# Patient Record
Sex: MALE | Race: BLACK OR AFRICAN AMERICAN | NOT HISPANIC OR LATINO
[De-identification: names, ages, dates, MRNs, and addresses within clinical notes are randomized per-mention and may not be internally consistent; named-entity substitution may affect disease eponyms.]

---

## 2018-03-05 ENCOUNTER — APPOINTMENT (OUTPATIENT)
Dept: ORTHOPEDIC SURGERY | Facility: CLINIC | Age: 63
End: 2018-03-05
Payer: COMMERCIAL

## 2018-03-05 VITALS
HEIGHT: 67 IN | SYSTOLIC BLOOD PRESSURE: 176 MMHG | BODY MASS INDEX: 30.13 KG/M2 | HEART RATE: 96 BPM | WEIGHT: 192 LBS | DIASTOLIC BLOOD PRESSURE: 117 MMHG

## 2018-03-05 DIAGNOSIS — Z96.641 PRESENCE OF RIGHT ARTIFICIAL HIP JOINT: ICD-10-CM

## 2018-03-05 DIAGNOSIS — Z82.61 FAMILY HISTORY OF ARTHRITIS: ICD-10-CM

## 2018-03-05 DIAGNOSIS — Z78.9 OTHER SPECIFIED HEALTH STATUS: ICD-10-CM

## 2018-03-05 PROBLEM — Z00.00 ENCOUNTER FOR PREVENTIVE HEALTH EXAMINATION: Status: ACTIVE | Noted: 2018-03-05

## 2018-03-05 PROCEDURE — 99214 OFFICE O/P EST MOD 30 MIN: CPT

## 2018-03-05 RX ORDER — ESOMEPRAZOLE MAGNESIUM 5 MG/1
GRANULE, DELAYED RELEASE ORAL
Refills: 0 | Status: ACTIVE | COMMUNITY

## 2018-03-05 RX ORDER — AMLODIPINE BESYLATE 5 MG/1
TABLET ORAL
Refills: 0 | Status: ACTIVE | COMMUNITY

## 2018-03-05 RX ORDER — VALSARTAN 160 MG
CAPSULE ORAL
Refills: 0 | Status: ACTIVE | COMMUNITY

## 2018-06-18 ENCOUNTER — APPOINTMENT (OUTPATIENT)
Dept: ORTHOPEDIC SURGERY | Facility: CLINIC | Age: 63
End: 2018-06-18
Payer: COMMERCIAL

## 2018-06-18 VITALS
HEIGHT: 67 IN | DIASTOLIC BLOOD PRESSURE: 86 MMHG | BODY MASS INDEX: 29.82 KG/M2 | WEIGHT: 190 LBS | SYSTOLIC BLOOD PRESSURE: 158 MMHG | HEART RATE: 60 BPM

## 2018-06-18 DIAGNOSIS — M75.41 IMPINGEMENT SYNDROME OF RIGHT SHOULDER: ICD-10-CM

## 2018-06-18 PROCEDURE — 99213 OFFICE O/P EST LOW 20 MIN: CPT

## 2018-06-18 PROCEDURE — 73030 X-RAY EXAM OF SHOULDER: CPT | Mod: RT

## 2018-07-20 ENCOUNTER — APPOINTMENT (OUTPATIENT)
Dept: ORTHOPEDIC SURGERY | Facility: CLINIC | Age: 63
End: 2018-07-20
Payer: COMMERCIAL

## 2018-07-20 VITALS — WEIGHT: 190 LBS | BODY MASS INDEX: 29.82 KG/M2 | RESPIRATION RATE: 14 BRPM | HEIGHT: 67 IN

## 2018-07-20 PROCEDURE — 99214 OFFICE O/P EST MOD 30 MIN: CPT

## 2018-08-09 ENCOUNTER — OUTPATIENT (OUTPATIENT)
Dept: OUTPATIENT SERVICES | Facility: HOSPITAL | Age: 63
LOS: 1 days | End: 2018-08-09
Payer: COMMERCIAL

## 2018-08-09 VITALS
TEMPERATURE: 97 F | WEIGHT: 190.04 LBS | HEIGHT: 67 IN | RESPIRATION RATE: 18 BRPM | SYSTOLIC BLOOD PRESSURE: 144 MMHG | OXYGEN SATURATION: 98 % | HEART RATE: 70 BPM | DIASTOLIC BLOOD PRESSURE: 80 MMHG

## 2018-08-09 DIAGNOSIS — Z96.649 PRESENCE OF UNSPECIFIED ARTIFICIAL HIP JOINT: Chronic | ICD-10-CM

## 2018-08-09 DIAGNOSIS — Z90.81 ACQUIRED ABSENCE OF SPLEEN: Chronic | ICD-10-CM

## 2018-08-09 DIAGNOSIS — M19.011 PRIMARY OSTEOARTHRITIS, RIGHT SHOULDER: ICD-10-CM

## 2018-08-09 DIAGNOSIS — Z01.818 ENCOUNTER FOR OTHER PREPROCEDURAL EXAMINATION: ICD-10-CM

## 2018-08-09 DIAGNOSIS — Z98.89 OTHER SPECIFIED POSTPROCEDURAL STATES: Chronic | ICD-10-CM

## 2018-08-09 LAB
ALBUMIN SERPL ELPH-MCNC: 3.9 G/DL — SIGNIFICANT CHANGE UP (ref 3.3–5)
ALP SERPL-CCNC: 73 U/L — SIGNIFICANT CHANGE UP (ref 30–120)
ALT FLD-CCNC: 32 U/L DA — SIGNIFICANT CHANGE UP (ref 10–60)
ANION GAP SERPL CALC-SCNC: 8 MMOL/L — SIGNIFICANT CHANGE UP (ref 5–17)
APTT BLD: 32.3 SEC — SIGNIFICANT CHANGE UP (ref 27.5–37.4)
AST SERPL-CCNC: 28 U/L — SIGNIFICANT CHANGE UP (ref 10–40)
BILIRUB SERPL-MCNC: 0.3 MG/DL — SIGNIFICANT CHANGE UP (ref 0.2–1.2)
BLD GP AB SCN SERPL QL: SIGNIFICANT CHANGE UP
BUN SERPL-MCNC: 13 MG/DL — SIGNIFICANT CHANGE UP (ref 7–23)
CALCIUM SERPL-MCNC: 10 MG/DL — SIGNIFICANT CHANGE UP (ref 8.4–10.5)
CHLORIDE SERPL-SCNC: 99 MMOL/L — SIGNIFICANT CHANGE UP (ref 96–108)
CO2 SERPL-SCNC: 26 MMOL/L — SIGNIFICANT CHANGE UP (ref 22–31)
CREAT SERPL-MCNC: 1.08 MG/DL — SIGNIFICANT CHANGE UP (ref 0.5–1.3)
GLUCOSE SERPL-MCNC: 208 MG/DL — HIGH (ref 70–99)
HCT VFR BLD CALC: 41.7 % — SIGNIFICANT CHANGE UP (ref 39–50)
HGB BLD-MCNC: 14.3 G/DL — SIGNIFICANT CHANGE UP (ref 13–17)
INR BLD: 1.01 RATIO — SIGNIFICANT CHANGE UP (ref 0.88–1.16)
MCHC RBC-ENTMCNC: 28.8 PG — SIGNIFICANT CHANGE UP (ref 27–34)
MCHC RBC-ENTMCNC: 34.3 GM/DL — SIGNIFICANT CHANGE UP (ref 32–36)
MCV RBC AUTO: 83.9 FL — SIGNIFICANT CHANGE UP (ref 80–100)
NRBC # BLD: 0 /100 WBCS — SIGNIFICANT CHANGE UP (ref 0–0)
PLATELET # BLD AUTO: 282 K/UL — SIGNIFICANT CHANGE UP (ref 150–400)
POTASSIUM SERPL-MCNC: 3.6 MMOL/L — SIGNIFICANT CHANGE UP (ref 3.5–5.3)
POTASSIUM SERPL-SCNC: 3.6 MMOL/L — SIGNIFICANT CHANGE UP (ref 3.5–5.3)
PROT SERPL-MCNC: 8.5 G/DL — HIGH (ref 6–8.3)
PROTHROM AB SERPL-ACNC: 11 SEC — SIGNIFICANT CHANGE UP (ref 9.8–12.7)
RBC # BLD: 4.97 M/UL — SIGNIFICANT CHANGE UP (ref 4.2–5.8)
RBC # FLD: 15.1 % — HIGH (ref 10.3–14.5)
SODIUM SERPL-SCNC: 133 MMOL/L — LOW (ref 135–145)
WBC # BLD: 11.04 K/UL — HIGH (ref 3.8–10.5)
WBC # FLD AUTO: 11.04 K/UL — HIGH (ref 3.8–10.5)

## 2018-08-09 PROCEDURE — 93010 ELECTROCARDIOGRAM REPORT: CPT | Mod: NC

## 2018-08-09 NOTE — H&P PST ADULT - FAMILY HISTORY
Father  Still living? No  Family history of lung cancer, Age at diagnosis: Age Unknown  Family history of eczema, Age at diagnosis: Age Unknown     Mother  Still living? No  Family history of dementia, Age at diagnosis: Age Unknown  Family history of eczema, Age at diagnosis: Age Unknown  Family history of cervical cancer, Age at diagnosis: Age Unknown     Sibling  Still living? No  Family history of alcohol abuse, Age at diagnosis: Age Unknown  Family history of breast cancer, Age at diagnosis: Age Unknown  Family history of ovarian cancer, Age at diagnosis: Age Unknown
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
## 2018-08-09 NOTE — H&P PST ADULT - HISTORY OF PRESENT ILLNESS
62 y/o male with right shoulder pain. Denies any acute injury. Failed conservative therapy. Intermittent pain with activities. Here for PST in NAD

## 2018-08-10 LAB
HBA1C BLD-MCNC: 7.5 % — HIGH (ref 4–5.6)
MRSA PCR RESULT.: SIGNIFICANT CHANGE UP
S AUREUS DNA NOSE QL NAA+PROBE: DETECTED

## 2018-08-10 PROCEDURE — 86850 RBC ANTIBODY SCREEN: CPT

## 2018-08-10 PROCEDURE — G0463: CPT

## 2018-08-10 PROCEDURE — 85027 COMPLETE CBC AUTOMATED: CPT

## 2018-08-10 PROCEDURE — 85610 PROTHROMBIN TIME: CPT

## 2018-08-10 PROCEDURE — 83036 HEMOGLOBIN GLYCOSYLATED A1C: CPT

## 2018-08-10 PROCEDURE — 86900 BLOOD TYPING SEROLOGIC ABO: CPT

## 2018-08-10 PROCEDURE — 85730 THROMBOPLASTIN TIME PARTIAL: CPT

## 2018-08-10 PROCEDURE — 80053 COMPREHEN METABOLIC PANEL: CPT

## 2018-08-10 PROCEDURE — 87640 STAPH A DNA AMP PROBE: CPT

## 2018-08-10 PROCEDURE — 36415 COLL VENOUS BLD VENIPUNCTURE: CPT

## 2018-08-10 PROCEDURE — 93005 ELECTROCARDIOGRAM TRACING: CPT

## 2018-08-10 PROCEDURE — 86901 BLOOD TYPING SEROLOGIC RH(D): CPT

## 2018-08-10 RX ORDER — MUPIROCIN 20 MG/G
1 OINTMENT TOPICAL
Qty: 1 | Refills: 0 | OUTPATIENT
Start: 2018-08-10 | End: 2018-08-14

## 2018-08-10 NOTE — PROGRESS NOTE ADULT - SUBJECTIVE AND OBJECTIVE BOX
nose culture positive for MSSA. Mupirocin ointment 2% escribed and sent to patient's pharmacy. to be used twice a day for 5 consecutive days. called patient who verbalized an understanding of the treatment protocol. questions answered.

## 2018-08-29 ENCOUNTER — APPOINTMENT (OUTPATIENT)
Dept: ORTHOPEDIC SURGERY | Facility: HOSPITAL | Age: 63
End: 2018-08-29

## 2022-01-12 ENCOUNTER — APPOINTMENT (OUTPATIENT)
Dept: ORTHOPEDIC SURGERY | Facility: CLINIC | Age: 67
End: 2022-01-12
Payer: COMMERCIAL

## 2022-01-12 DIAGNOSIS — M16.11 UNILATERAL PRIMARY OSTEOARTHRITIS, RIGHT HIP: ICD-10-CM

## 2022-01-12 DIAGNOSIS — Z96.641 PRESENCE OF RIGHT ARTIFICIAL HIP JOINT: ICD-10-CM

## 2022-01-12 PROBLEM — M19.011 PRIMARY OSTEOARTHRITIS, RIGHT SHOULDER: Chronic | Status: ACTIVE | Noted: 2018-08-09

## 2022-01-12 PROCEDURE — 73502 X-RAY EXAM HIP UNI 2-3 VIEWS: CPT | Mod: RT

## 2022-01-12 PROCEDURE — 99202 OFFICE O/P NEW SF 15 MIN: CPT

## 2022-01-12 NOTE — DISCUSSION/SUMMARY
[de-identified] : Impression right hip gluteal tendinitis/strain, greater trochanteric bursitis, myofascial syndrome, rule out polyethylene wear with synovitis\par Plan MRI right hip, baseline CBC sed rate C-reactive protein

## 2022-01-12 NOTE — HISTORY OF PRESENT ILLNESS
[de-identified] : This is a 67-year-old male who 2016 underwent uneventful right anterior total hip replacement.  Doing well until 1 day ago when he woke up with moderately severe right lateral hip pain aggravated by weightbearing.  Denies fever or chills.  Denies low back pain or neurovascular symptoms lower extremity

## 2022-01-12 NOTE — PHYSICAL EXAM
[de-identified] : Constitutional:Well nourished , well developed and in no acute distress\par Psychiatric: Alert and oriented to time place and person.Appropriate affect \par Skin:Head, neck, arms and lower extremities:no lesions or discoloration\par HEENT: Normocephalic, EOM intact, Nasal septum midline,\par Respiratory: Unlabored respirations,no audible wheezing ,no tachypnea, no cyanosis\par Cardiovascular: no leg swelling  no ankle edema no JVD, pulse regular\par Vascular: no calf or thigh tenderness, \par Peripheral pulses; intact\par Lymphatics:No groin adenopathy,no lymphedema lower  or upper extremities\par Right hip antalgic gait leg length equal incision healed tender greater trochanter tender incision site passive range of motion rotation provokes mild to moderate pain flexion 90 internal 30 external 40 abduction 45 adduction 40 [de-identified] : X-ray AP pelvis right hip AP lateral oblique component alignment maintained no loosening subsidence

## 2022-09-29 ENCOUNTER — OUTPATIENT (OUTPATIENT)
Dept: OUTPATIENT SERVICES | Facility: HOSPITAL | Age: 67
LOS: 1 days | End: 2022-09-29
Payer: COMMERCIAL

## 2022-09-29 DIAGNOSIS — Z90.81 ACQUIRED ABSENCE OF SPLEEN: Chronic | ICD-10-CM

## 2022-09-29 DIAGNOSIS — Z96.649 PRESENCE OF UNSPECIFIED ARTIFICIAL HIP JOINT: Chronic | ICD-10-CM

## 2022-09-29 DIAGNOSIS — Z98.89 OTHER SPECIFIED POSTPROCEDURAL STATES: Chronic | ICD-10-CM

## 2022-09-29 DIAGNOSIS — R06.00 DYSPNEA, UNSPECIFIED: ICD-10-CM

## 2022-09-29 PROCEDURE — 93018 CV STRESS TEST I&R ONLY: CPT

## 2022-09-29 PROCEDURE — 93306 TTE W/DOPPLER COMPLETE: CPT | Mod: 26

## 2022-09-29 PROCEDURE — 93016 CV STRESS TEST SUPVJ ONLY: CPT

## 2022-09-29 PROCEDURE — 78452 HT MUSCLE IMAGE SPECT MULT: CPT | Mod: 26,MH

## 2022-10-03 PROCEDURE — 78452 HT MUSCLE IMAGE SPECT MULT: CPT | Mod: MH

## 2022-10-03 PROCEDURE — A9502: CPT

## 2022-10-03 PROCEDURE — 93017 CV STRESS TEST TRACING ONLY: CPT

## 2022-10-03 PROCEDURE — 93306 TTE W/DOPPLER COMPLETE: CPT

## 2022-10-26 ENCOUNTER — APPOINTMENT (OUTPATIENT)
Dept: UROLOGY | Facility: CLINIC | Age: 67
End: 2022-10-26

## 2022-11-10 ENCOUNTER — RESULT CHARGE (OUTPATIENT)
Age: 67
End: 2022-11-10

## 2022-11-10 ENCOUNTER — APPOINTMENT (OUTPATIENT)
Dept: ORTHOPEDIC SURGERY | Facility: CLINIC | Age: 67
End: 2022-11-10

## 2022-11-10 VITALS — WEIGHT: 191 LBS | HEIGHT: 67 IN | BODY MASS INDEX: 29.98 KG/M2

## 2022-11-10 DIAGNOSIS — M70.71 OTHER BURSITIS OF HIP, RIGHT HIP: ICD-10-CM

## 2022-11-10 DIAGNOSIS — M70.60 TROCHANTERIC BURSITIS, UNSPECIFIED HIP: ICD-10-CM

## 2022-11-10 DIAGNOSIS — M76.899 OTHER SPECIFIED ENTHESOPATHIES OF UNSPECIFIED LOWER LIMB, EXCLUDING FOOT: ICD-10-CM

## 2022-11-10 DIAGNOSIS — Z86.39 PERSONAL HISTORY OF OTHER ENDOCRINE, NUTRITIONAL AND METABOLIC DISEASE: ICD-10-CM

## 2022-11-10 DIAGNOSIS — S76.011A STRAIN OF MUSCLE, FASCIA AND TENDON OF RIGHT HIP, INITIAL ENCOUNTER: ICD-10-CM

## 2022-11-10 DIAGNOSIS — Z87.19 PERSONAL HISTORY OF OTHER DISEASES OF THE DIGESTIVE SYSTEM: ICD-10-CM

## 2022-11-10 DIAGNOSIS — Z86.79 PERSONAL HISTORY OF OTHER DISEASES OF THE CIRCULATORY SYSTEM: ICD-10-CM

## 2022-11-10 PROCEDURE — 72100 X-RAY EXAM L-S SPINE 2/3 VWS: CPT

## 2022-11-10 PROCEDURE — 99204 OFFICE O/P NEW MOD 45 MIN: CPT

## 2022-11-10 PROCEDURE — 73521 X-RAY EXAM HIPS BI 2 VIEWS: CPT

## 2022-11-10 RX ORDER — MELOXICAM 15 MG/1
15 TABLET ORAL
Qty: 30 | Refills: 2 | Status: ACTIVE | COMMUNITY
Start: 2022-11-10 | End: 1900-01-01

## 2022-11-10 NOTE — HISTORY OF PRESENT ILLNESS
[6] : 6 [Dull/Aching] : dull/aching [Radiating] : radiating [Rest] : rest [Heat] : heat [Standing] : standing [Walking] : walking [Stairs] : stairs [Full time] : Work status: full time [de-identified] : no recent injury. Patient is a 67 year old male here today for ongoing right hip pain for the past 6 months. Pain has worsened within the last 2 weeks. Has a history of total hip replacement in 2016. Patient was pain free for 3 years [] : Post Surgical Visit: no [FreeTextEntry1] : right hip [FreeTextEntry7] : down the right leg [de-identified] : 2016 [de-identified] : none

## 2022-11-10 NOTE — ASSESSMENT
[FreeTextEntry1] : 67 year M with right hip KIMBERLY and hip flexor tendonitis and GT bursitis\par - PT and NSAIDs PRN\par - Return in 6 weeks for follow up PRN

## 2022-11-10 NOTE — IMAGING
[de-identified] : Constitutional: well developed and well nourished, able to communicate\par Cardiovascular: Peripheral vascular exam is grossly normal\par Neurologic: Alert and oriented, no acute distress.\par Skin: normal skin with no ulcers, rashes, or lesions\par Pulmonary: No respiratory distress, breathing comfortably on room air\par Lymphatics: No obvious lymphadenopathy or lymphedema in areas examined\par  \par LOWER EXTREMITY/ RIGHT HIP EXAM\par Standing pelvic alignment: Symmetric with no Trendelenburg\par Atrophy: none\par Ecchymosis/swelling: none\par \par Range of Motion\par Hip: Flexion/extension/ER/IR     / EX 20/ ER 45/ IR 20 / AB 60 /AD 20\par Impingement with flexion adduction and internal rotation: negative\par Contracture: none\par Snapping hip: negative\par Greater trochanter: POS tenderness\par \par Neurovascular\par Distal extremities: warm to touch\par Sensation to light touch: intact\par Muscle strength: 5/5\par pain with resisted hip flexion\par \par Back\par Alignment: normal\par Spinous process: no tenderness\par Paraspinal tenderness: No tenderness\par Range of motion: full and pain free\par Scoliosis: none\par Straight leg sign: negative\par \par IMAGING:\par 11/10/2022 Xrays of the Right hip 3v were taken demonstrating KIMBERLY in place w/o signs of loosening and lumbar with L5/S1 DDD

## 2023-04-29 ENCOUNTER — EMERGENCY (EMERGENCY)
Facility: HOSPITAL | Age: 68
LOS: 1 days | Discharge: HOME | End: 2023-04-29
Admitting: STUDENT IN AN ORGANIZED HEALTH CARE EDUCATION/TRAINING PROGRAM
Payer: COMMERCIAL

## 2023-04-29 VITALS
SYSTOLIC BLOOD PRESSURE: 156 MMHG | HEART RATE: 78 BPM | RESPIRATION RATE: 18 BRPM | TEMPERATURE: 99 F | OXYGEN SATURATION: 100 % | DIASTOLIC BLOOD PRESSURE: 81 MMHG

## 2023-04-29 DIAGNOSIS — Z90.81 ACQUIRED ABSENCE OF SPLEEN: Chronic | ICD-10-CM

## 2023-04-29 DIAGNOSIS — Z98.89 OTHER SPECIFIED POSTPROCEDURAL STATES: Chronic | ICD-10-CM

## 2023-04-29 DIAGNOSIS — Z96.649 PRESENCE OF UNSPECIFIED ARTIFICIAL HIP JOINT: Chronic | ICD-10-CM

## 2023-04-29 PROCEDURE — 99284 EMERGENCY DEPT VISIT MOD MDM: CPT

## 2023-04-29 PROCEDURE — 73030 X-RAY EXAM OF SHOULDER: CPT | Mod: 26,RT

## 2023-04-29 RX ORDER — ACETAMINOPHEN 500 MG
975 TABLET ORAL ONCE
Refills: 0 | Status: COMPLETED | OUTPATIENT
Start: 2023-04-29 | End: 2023-04-29

## 2023-04-29 RX ORDER — KETOROLAC TROMETHAMINE 30 MG/ML
15 SYRINGE (ML) INJECTION ONCE
Refills: 0 | Status: DISCONTINUED | OUTPATIENT
Start: 2023-04-29 | End: 2023-04-29

## 2023-04-29 RX ORDER — LIDOCAINE 4 G/100G
1 CREAM TOPICAL ONCE
Refills: 0 | Status: COMPLETED | OUTPATIENT
Start: 2023-04-29 | End: 2023-04-29

## 2023-04-29 RX ORDER — DIAZEPAM 5 MG
2 TABLET ORAL ONCE
Refills: 0 | Status: DISCONTINUED | OUTPATIENT
Start: 2023-04-29 | End: 2023-04-29

## 2023-04-29 RX ORDER — IBUPROFEN 200 MG
1 TABLET ORAL
Qty: 15 | Refills: 0
Start: 2023-04-29 | End: 2023-05-03

## 2023-04-29 RX ADMIN — LIDOCAINE 1 PATCH: 4 CREAM TOPICAL at 18:54

## 2023-04-29 RX ADMIN — Medication 975 MILLIGRAM(S): at 21:30

## 2023-04-29 RX ADMIN — Medication 2 MILLIGRAM(S): at 18:59

## 2023-04-29 RX ADMIN — Medication 15 MILLIGRAM(S): at 18:52

## 2023-04-29 NOTE — ED PROVIDER NOTE - OBJECTIVE STATEMENT
68yoM PMH HTN, HLD, PreDM, GERD, prior rotator cuff injury years ago, p/w atraumatic shoulder pain starting last night which woke him up from sleep. sharp, worse with movement, gradually worsening this morning prompting ER eval. Pt denies any numbness, tingling. R hand dominant. was told he needed rotator cuff repair surgically years ago however did not get Surgery at that time. Pt denies any inciting injury, heavy lifting. Took 2 ibuprofen this AM around 7:30 with minimal relief, and took 2 tylenol around 4:30PM. However patient does play instruments including piano. Pt denies any fevers, neck pain, chest pain, sob, dizziness, cough, sore throat, weakness.

## 2023-04-29 NOTE — ED PROVIDER NOTE - CLINICAL SUMMARY MEDICAL DECISION MAKING FREE TEXT BOX
68yoM PMH HTN, HLD, PreDM, GERD, prior rotator cuff injury years ago, p/w atraumatic shoulder pain starting last night which woke him up from sleep  REduced ROM, rotator cuff ttp, + ahumada, sensation/pulses intact  + ttp of trapezius ttp, maybe concomitant muscle spasm    will give toradol, valium, xray, and outpatient ortho f/u

## 2023-04-29 NOTE — ED ADULT TRIAGE NOTE - CHIEF COMPLAINT QUOTE
Pt states had an old injury to right rotator cuff . Pt states last night  into this am pain increased. Pt states not relieved with Motrin.

## 2023-04-29 NOTE — ED ADULT NURSE NOTE - OBJECTIVE STATEMENT
A&Ox4. ambulatory. c/o right shoulder pain. PT states he was to have surgery for his rotator cuff but because of COVID pandemic it was not done. NAD. pt denies SOB, chest pain, dizziness, weakness, urinary symptoms, HA, n/v/d, fevers, chills. respirations are even and un labored. skin intact. safety precautions maintained.
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
## 2023-04-29 NOTE — ED PROVIDER NOTE - PATIENT PORTAL LINK FT
You can access the FollowMyHealth Patient Portal offered by [facility] by registering at the following website: [email or web address]. By joining [employer]’s FollowMyHealth portal, you will also be able to view your health information using other applications (apps) compatible with our system.

## 2023-04-29 NOTE — ED PROVIDER NOTE - NSICDXFAMILYHX_GEN_ALL_CORE_FT
FAMILY HISTORY:  Father  Still living? No  Family history of eczema, Age at diagnosis: Age Unknown  Family history of lung cancer, Age at diagnosis: Age Unknown    Mother  Still living? No  Family history of cervical cancer, Age at diagnosis: Age Unknown  Family history of dementia, Age at diagnosis: Age Unknown  Family history of eczema, Age at diagnosis: Age Unknown    Sibling  Still living? No  Family history of alcohol abuse, Age at diagnosis: Age Unknown  Family history of breast cancer, Age at diagnosis: Age Unknown  Family history of ovarian cancer, Age at diagnosis: Age Unknown

## 2023-04-29 NOTE — ED PROVIDER NOTE - BIRTH SEX
Male Complex Repair And Ftsg Text: The defect edges were debeveled with a #15 scalpel blade.  The primary defect was closed partially with a complex linear closure.  Given the location of the defect, shape of the defect and the proximity to free margins a full thickness skin graft was deemed most appropriate to repair the remaining defect.  The graft was trimmed to fit the size of the remaining defect.  The graft was then placed in the primary defect, oriented appropriately, and sutured into place.

## 2023-04-29 NOTE — ED PROVIDER NOTE - PROGRESS NOTE DETAILS
pain improved, xrays revealing arthritis will place in sling due to pain w/ ROM, however advised to not exceed more than 48 hours and to f/u with ortopedic outpatient, discharge lounge button clicked
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
## 2023-04-29 NOTE — ED PROVIDER NOTE - NSFOLLOWUPCLINICS_GEN_ALL_ED_FT
[facility] Orthopedic Surgery  Orthopedic Surgery  [address], 3rd & [address]th [place] [address]  Phone: [phone]  Fax:     [facility]  Orthopedic Surgery  [address] [address]  Phone: [phone]  Fax:

## 2023-04-29 NOTE — ED PROVIDER NOTE - PHYSICAL EXAMINATION
CONSTITUTIONAL: Well-appearing; well-nourished; in no apparent distress;  HEAD: Normocephalic, atraumatic;  EYES: conjunctiva and sclera WNL;  ENT: normal nose;  NECK/LYMPH: Supple  CARD: Normal S1, S2; no murmurs, rubs, or gallops noted  RESP: Normal chest excursion with respiration; breath sounds clear and equal bilaterally; no wheezes, rhonchi, or rales noted  EXT/MS: no visible deformity. + ttp of R scapular region. + ttp of R glenohumeral joint. + posterior deltoid ttp. + pain w/ passive and active ROM. + ahumada test. unable to fully abduct and flex arm actively 2/2 pain. sensation intact.  strength 5/5 b/l. no elbow ttp. moves all extremities; distal pulses are normal, no pedal edema  SKIN: Normal for age and race; warm; dry; good turgor; no apparent lesions or exudate noted  NEURO: Awake, alert, oriented x 3, no gross deficits, CN II-XII grossly intact, no motor or sensory deficit noted  PSYCH: Normal mood; appropriate affect

## 2023-04-29 NOTE — ED PROVIDER NOTE - NSFOLLOWUPINSTRUCTIONS_ED_ALL_ED_FT
Shoulder Pain    WHAT YOU NEED TO KNOW:    Shoulder pain is a common problem that can affect your daily activities. Pain can be caused by a problem within your shoulder, such as soreness of a tendon or bursa. A tendon is a cord of tough tissue that connects your muscles to your bones. The bursa is a fluid-filled sac that acts as a cushion between a bone and a tendon. Shoulder pain may also be caused by pain that spreads to your shoulder from another part of your body.  Shoulder Anatomy    DISCHARGE INSTRUCTIONS:    Return to the emergency department if:    You have severe pain.    You cannot move your arm or shoulder.    You have numbness or tingling in your shoulder or arm.  Contact your healthcare provider if:    Your pain gets worse or does not go away with treatment.    You have trouble moving your arm or shoulder.    You have questions or concerns about your condition or care.  Medicines: You may need any of the following:    Acetaminophen decreases pain and fever. It is available without a doctor's order. Ask how much to take and how often to take it. Follow directions. Read the labels of all other medicines you are using to see if they also contain acetaminophen, or ask your doctor or pharmacist. Acetaminophen can cause liver damage if not taken correctly. Do not use more than 4 grams (4,000 milligrams) total of acetaminophen in one day.    NSAIDs, such as ibuprofen, help decrease swelling, pain, and fever. This medicine is available with or without a doctor's order. NSAIDs can cause stomach bleeding or kidney problems in certain people. If you take blood thinner medicine, always ask your healthcare provider if NSAIDs are safe for you. Always read the medicine label and follow directions.    Take your medicine as directed. Contact your healthcare provider if you think your medicine is not helping or if you have side effects. Tell him of her if you are allergic to any medicine. Keep a list of the medicines, vitamins, and herbs you take. Include the amounts, and when and why you take them. Bring the list or the pill bottles to follow-up visits. Carry your medicine list with you in case of an emergency.  Manage your symptoms:    Apply ice on your shoulder for 20 to 30 minutes every 2 hours or as directed. Use an ice pack, or put crushed ice in a plastic bag. Cover it with a towel before you apply it to your shoulder. Ice helps prevent tissue damage and decreases swelling and pain.    Apply heat if ice does not help your symptoms. Apply heat on your shoulder for 20 to 30 minutes every 2 hours for as many days as directed. Heat helps decrease pain and muscle spasms.    Limit activities as directed. Try to avoid repeated overhead movements.    Go to physical or occupational therapy as directed. A physical therapist teaches you exercises to help improve movement and strength, and to decrease pain. An occupational therapist teaches you skills to help with your daily activities.  Prevent shoulder pain:    Maintain a good range of motion in your shoulder. Ask your healthcare provider which exercises you should do on a regular basis after you have healed.    Stretch and strengthen your shoulder. Use proper technique during exercises and sports.  Follow up with your healthcare provider or orthopedist as directed: Write down your questions so you remember to ask them during your visits.

## 2023-05-11 ENCOUNTER — APPOINTMENT (OUTPATIENT)
Dept: ORTHOPEDIC SURGERY | Facility: CLINIC | Age: 68
End: 2023-05-11
Payer: COMMERCIAL

## 2023-05-11 PROCEDURE — 99213 OFFICE O/P EST LOW 20 MIN: CPT

## 2023-05-11 NOTE — PHYSICAL EXAM
[de-identified] : Patient is WDWN, alert, and in no acute distress. Breathing is unlabored. He is grossly oriented to person, place, and time.\par \par Right Shoulder:\par ¦ Inspection/Palpation :there is localized tenderness present to the greater tuberosity, there is no AC joint tenderness, no swelling or deformities\par ¦ Range of Motion : ACTIVE FORWARD ELEVATION: Measured at 130 degrees, ACTIVE EXTERNAL ROTATION: Measured at 45 degrees, ACTIVE INTERNAL ROTATION: Arc of motion to L1\par ¦ Strength : internal rotation 5/5, external rotation 4+/5, supraspinatus 4/5\par ¦ Stability : no joint instability on provocative testing\par ¦ Tests/Signs : +Neer, + Trivdei \par o Upper Arm : no tenderness, swelling or deformities\par o Muscle Bulk : no atrophy o Sensation : sensation intact to light touch \par o Skin : no skin rash or discoloration\par o Vascular Exam : no edema or cyanosis, radial and ulnar pulses normal  [de-identified] : EXAM: XR SHOULDER COMP MIN 2V RT\par PROCEDURE DATE: 04/29/2023\par IMPRESSION:\par No acute fracture or dislocation.\par Severe osteoarthritis of the glenohumeral joint. Unremarkable AC joint.\par The visualized right lung is clear.\par \par JEYSON MURDOCK DO; Resident Radiologist\par This document has been electronically signed.\par HEATHER SHAIKH MD; Attending Radiologist\par This document has been electronically signed. Apr 30 2023 8:59AM\par \par ------------------------------------------------------------------------------------------------------------------------------------------------------------------------------------\par \par [facility] the right [facility] and shows marked glenohumeral articulation degenerative arthroplasty, 3.9 cm infraspinatus intramuscular cyst with partial tear of the rotator cuff, insertional partial tear of the rotator cuff associated with the distal supraspinatus tendon with adjacent bursal effusion, partial tear rotator cuff also identified at the undersurface of the proximal supraspinatus tendon, supraspinatus and subscapularis tendinopathy, biceps tenosynovitis.
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
## 2023-05-11 NOTE — ADDENDUM
[FreeTextEntry1] : I, Lexii Carrillo wrote this note acting as a scribe for Dr. Robert Shah on May 11, 2023.

## 2023-05-11 NOTE — END OF VISIT
[FreeTextEntry3] : All medical record entries made by the Scribe were at my,  Dr. Robert Shah MD., direction and personally dictated by me on 05/11/2023. I have personally reviewed the chart and agree that the record accurately reflects my personal performance of the history, physical exam, assessment and plan.

## 2023-05-11 NOTE — DISCUSSION/SUMMARY
[de-identified] : The underlying pathophysiology was reviewed with the patient. XR films were reviewed with the patient. Discussed at length the nature of the patient’s condition. The right shoulder symptoms are secondary to severe arthritis of the glenohumeral joint.\par \par At this time, given the severity of arthritis seen at the glenohumeral joint and his persistent symptoms, I told him he may want to consider a shoulder replacement. I explained to him that this is not a procedure I perform and would refer him to Dr. Lorenzana. He stated he is interested in undergoing a total shoulder replacement but is not able to do so until about August. I told him that is reasonable as this is by no means an emergency. He will follow up with Dr. Lorenzana for surgical consult. \par \par All questions answered, understanding verbalized. Patient in agreement with plan of care. Follow up as needed.

## 2023-05-23 ENCOUNTER — APPOINTMENT (OUTPATIENT)
Dept: ORTHOPEDIC SURGERY | Facility: CLINIC | Age: 68
End: 2023-05-23

## 2023-06-20 ENCOUNTER — APPOINTMENT (OUTPATIENT)
Dept: ORTHOPEDIC SURGERY | Facility: CLINIC | Age: 68
End: 2023-06-20

## 2023-06-30 ENCOUNTER — APPOINTMENT (OUTPATIENT)
Dept: ORTHOPEDIC SURGERY | Facility: CLINIC | Age: 68
End: 2023-06-30
Payer: COMMERCIAL

## 2023-06-30 VITALS — HEIGHT: 67 IN | WEIGHT: 191 LBS | BODY MASS INDEX: 29.98 KG/M2

## 2023-06-30 DIAGNOSIS — Z01.818 ENCOUNTER FOR OTHER PREPROCEDURAL EXAMINATION: ICD-10-CM

## 2023-06-30 DIAGNOSIS — M19.012 PRIMARY OSTEOARTHRITIS, LEFT SHOULDER: ICD-10-CM

## 2023-06-30 DIAGNOSIS — M19.011 PRIMARY OSTEOARTHRITIS, RIGHT SHOULDER: ICD-10-CM

## 2023-06-30 PROCEDURE — 73030 X-RAY EXAM OF SHOULDER: CPT | Mod: 50

## 2023-06-30 PROCEDURE — 99214 OFFICE O/P EST MOD 30 MIN: CPT

## 2023-08-23 ENCOUNTER — APPOINTMENT (OUTPATIENT)
Dept: ORTHOPEDIC SURGERY | Facility: HOSPITAL | Age: 68
End: 2023-08-23

## 2024-10-10 ENCOUNTER — EMERGENCY (EMERGENCY)
Facility: HOSPITAL | Age: 69
LOS: 0 days | Discharge: HOME | End: 2024-10-10
Attending: EMERGENCY MEDICINE
Payer: COMMERCIAL

## 2024-10-10 VITALS
OXYGEN SATURATION: 99 % | HEIGHT: 67 IN | TEMPERATURE: 98 F | DIASTOLIC BLOOD PRESSURE: 89 MMHG | SYSTOLIC BLOOD PRESSURE: 155 MMHG | WEIGHT: 190.92 LBS | HEART RATE: 75 BPM | RESPIRATION RATE: 18 BRPM

## 2024-10-10 VITALS
RESPIRATION RATE: 19 BRPM | DIASTOLIC BLOOD PRESSURE: 84 MMHG | TEMPERATURE: 98 F | OXYGEN SATURATION: 96 % | SYSTOLIC BLOOD PRESSURE: 155 MMHG | HEART RATE: 88 BPM

## 2024-10-10 DIAGNOSIS — Z98.89 OTHER SPECIFIED POSTPROCEDURAL STATES: Chronic | ICD-10-CM
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
## 2024-10-10 DIAGNOSIS — M25.562 PAIN IN LEFT KNEE: ICD-10-CM

## 2024-10-10 DIAGNOSIS — M25.512 PAIN IN LEFT SHOULDER: ICD-10-CM

## 2024-10-10 DIAGNOSIS — F17.200 NICOTINE DEPENDENCE, UNSPECIFIED, UNCOMPLICATED: ICD-10-CM
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
## 2024-10-10 DIAGNOSIS — Z96.649 PRESENCE OF UNSPECIFIED ARTIFICIAL HIP JOINT: Chronic | ICD-10-CM
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
## 2024-10-10 DIAGNOSIS — Z90.81 ACQUIRED ABSENCE OF SPLEEN: Chronic | ICD-10-CM

## 2024-10-10 DIAGNOSIS — I10 ESSENTIAL (PRIMARY) HYPERTENSION: ICD-10-CM

## 2024-10-10 PROCEDURE — 73562 X-RAY EXAM OF KNEE 3: CPT | Mod: 26,LT

## 2024-10-10 PROCEDURE — 73030 X-RAY EXAM OF SHOULDER: CPT | Mod: 26,LT

## 2024-10-10 PROCEDURE — 99284 EMERGENCY DEPT VISIT MOD MDM: CPT

## 2024-10-10 RX ADMIN — Medication 600 MILLIGRAM(S): at 10:16

## 2024-10-10 NOTE — ED PROVIDER NOTE - PATIENT PORTAL LINK FT
You can access the FollowMyHealth Patient Portal offered by [facility] by registering at the following website: [email or web address]. By joining [employer]’s FollowMyHealth portal, you will also be able to view your health information using other applications (apps) compatible with our system.
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
## 2024-10-10 NOTE — ED PROVIDER NOTE - PHYSICAL EXAMINATION
On Physical Exam:  General: well appearing, in NAD, speaking clearly in full sentences and without difficulty; cooperative with exam  HEENT: PERRL, MMM  Neck: no neck tenderness, no nuchal rigidity; FROM of neck  Cardiac: normal s1, s2; RRR; no MGR  Lungs: CTABL  Abdomen: soft nontender/nondistended  : no bladder tenderness or distension  Skin: intact, no rash  Extremities: no peripheral edema, no gross deformities  -LUE: L posterior shoulder mild tenderness and unable to fully extend arm (cannot raise arm above head) but no cliavular tenderness or anterior tenderness; L elbow has FROM is nontender; L wrist has FROM is nontender.   -RUE: no gross deformities or pain  -RLE: no gross deformities or pain  -LLE: mild tenderness over superior portion of patella but no deformity, has FROM and no laxity, no pain with valgus/varus tension or axial loading  Neuro: GCS 15, stable gait observed
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
## 2024-10-10 NOTE — ED PROVIDER NOTE - WR INTERPRETATION 1
Left shoulder noted to have circular radiodensity lateral to the humeral head approximately 6 mm.  No evidence of fracture dislocation joint.
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
## 2024-10-10 NOTE — ED PROVIDER NOTE - WR INTERPRETATION 2
Opacification superior to patella question avulsion but more likely osteophyte or calcification of quad tendon no other evidence of fractures.  No dislocation.
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
## 2025-03-05 ENCOUNTER — INPATIENT (INPATIENT)
Facility: HOSPITAL | Age: 70
LOS: 0 days | Discharge: HOME | End: 2025-03-06
Attending: INTERNAL MEDICINE | Admitting: INTERNAL MEDICINE
Payer: COMMERCIAL

## 2025-03-05 VITALS
HEART RATE: 65 BPM | OXYGEN SATURATION: 100 % | HEIGHT: 67 IN | RESPIRATION RATE: 15 BRPM | DIASTOLIC BLOOD PRESSURE: 80 MMHG | WEIGHT: 199.96 LBS | TEMPERATURE: 98 F | SYSTOLIC BLOOD PRESSURE: 155 MMHG

## 2025-03-05 DIAGNOSIS — Z90.81 ACQUIRED ABSENCE OF SPLEEN: Chronic | ICD-10-CM

## 2025-03-05 DIAGNOSIS — Z98.89 OTHER SPECIFIED POSTPROCEDURAL STATES: Chronic | ICD-10-CM

## 2025-03-05 DIAGNOSIS — Z98.890 OTHER SPECIFIED POSTPROCEDURAL STATES: Chronic | ICD-10-CM

## 2025-03-05 DIAGNOSIS — R06.09 OTHER FORMS OF DYSPNEA: ICD-10-CM

## 2025-03-05 DIAGNOSIS — Z96.649 PRESENCE OF UNSPECIFIED ARTIFICIAL HIP JOINT: Chronic | ICD-10-CM

## 2025-03-05 LAB
ALBUMIN SERPL ELPH-MCNC: 4.4 G/DL — SIGNIFICANT CHANGE UP (ref 3.3–5)
ALP SERPL-CCNC: 68 U/L — SIGNIFICANT CHANGE UP (ref 40–120)
ALT FLD-CCNC: 23 U/L — SIGNIFICANT CHANGE UP (ref 4–41)
ANION GAP SERPL CALC-SCNC: 14 MMOL/L — SIGNIFICANT CHANGE UP (ref 7–14)
AST SERPL-CCNC: 28 U/L — SIGNIFICANT CHANGE UP (ref 4–40)
BILIRUB SERPL-MCNC: 0.3 MG/DL — SIGNIFICANT CHANGE UP (ref 0.2–1.2)
BUN SERPL-MCNC: 14 MG/DL — SIGNIFICANT CHANGE UP (ref 7–23)
CALCIUM SERPL-MCNC: 10.4 MG/DL — SIGNIFICANT CHANGE UP (ref 8.4–10.5)
CHLORIDE SERPL-SCNC: 101 MMOL/L — SIGNIFICANT CHANGE UP (ref 98–107)
CO2 SERPL-SCNC: 25 MMOL/L — SIGNIFICANT CHANGE UP (ref 22–31)
CREAT SERPL-MCNC: 0.76 MG/DL — SIGNIFICANT CHANGE UP (ref 0.5–1.3)
EGFR: 98 ML/MIN/1.73M2 — SIGNIFICANT CHANGE UP
EGFR: 98 ML/MIN/1.73M2 — SIGNIFICANT CHANGE UP
GLUCOSE SERPL-MCNC: 139 MG/DL — HIGH (ref 70–99)
HCT VFR BLD CALC: 40.6 % — SIGNIFICANT CHANGE UP (ref 39–50)
HGB BLD-MCNC: 13.8 G/DL — SIGNIFICANT CHANGE UP (ref 13–17)
MAGNESIUM SERPL-MCNC: 1.7 MG/DL — SIGNIFICANT CHANGE UP (ref 1.6–2.6)
MCHC RBC-ENTMCNC: 28.4 PG — SIGNIFICANT CHANGE UP (ref 27–34)
MCHC RBC-ENTMCNC: 34 G/DL — SIGNIFICANT CHANGE UP (ref 32–36)
MCV RBC AUTO: 83.5 FL — SIGNIFICANT CHANGE UP (ref 80–100)
NRBC # BLD AUTO: 0 K/UL — SIGNIFICANT CHANGE UP (ref 0–0)
NRBC # FLD: 0 K/UL — SIGNIFICANT CHANGE UP (ref 0–0)
NRBC BLD AUTO-RTO: 0 /100 WBCS — SIGNIFICANT CHANGE UP (ref 0–0)
PHOSPHATE SERPL-MCNC: 2.8 MG/DL — SIGNIFICANT CHANGE UP (ref 2.5–4.5)
PLATELET # BLD AUTO: 313 K/UL — SIGNIFICANT CHANGE UP (ref 150–400)
POTASSIUM SERPL-MCNC: 3.6 MMOL/L — SIGNIFICANT CHANGE UP (ref 3.5–5.3)
POTASSIUM SERPL-SCNC: 3.6 MMOL/L — SIGNIFICANT CHANGE UP (ref 3.5–5.3)
PROT SERPL-MCNC: 8.1 G/DL — SIGNIFICANT CHANGE UP (ref 6–8.3)
RBC # BLD: 4.86 M/UL — SIGNIFICANT CHANGE UP (ref 4.2–5.8)
RBC # FLD: 14.6 % — HIGH (ref 10.3–14.5)
SODIUM SERPL-SCNC: 140 MMOL/L — SIGNIFICANT CHANGE UP (ref 135–145)
WBC # BLD: 11.67 K/UL — HIGH (ref 3.8–10.5)
WBC # FLD AUTO: 11.67 K/UL — HIGH (ref 3.8–10.5)

## 2025-03-05 PROCEDURE — 93458 L HRT ARTERY/VENTRICLE ANGIO: CPT | Mod: 26,59

## 2025-03-05 PROCEDURE — 93010 ELECTROCARDIOGRAM REPORT: CPT | Mod: 77

## 2025-03-05 PROCEDURE — 93571 IV DOP VEL&/PRESS C FLO 1ST: CPT | Mod: 26,LD

## 2025-03-05 PROCEDURE — 92972 PERQ TRLUML CORONRY LITHOTRP: CPT

## 2025-03-05 PROCEDURE — 93010 ELECTROCARDIOGRAM REPORT: CPT

## 2025-03-05 PROCEDURE — 92928 PRQ TCAT PLMT NTRAC ST 1 LES: CPT | Mod: LD

## 2025-03-05 RX ORDER — DEXTROSE 50 % IN WATER 50 %
15 SYRINGE (ML) INTRAVENOUS ONCE
Refills: 0 | Status: DISCONTINUED | OUTPATIENT
Start: 2025-03-05 | End: 2025-03-06

## 2025-03-05 RX ORDER — INSULIN LISPRO 100 U/ML
INJECTION, SOLUTION INTRAVENOUS; SUBCUTANEOUS
Refills: 0 | Status: DISCONTINUED | OUTPATIENT
Start: 2025-03-05 | End: 2025-03-06

## 2025-03-05 RX ORDER — DEXTROSE 50 % IN WATER 50 %
12.5 SYRINGE (ML) INTRAVENOUS ONCE
Refills: 0 | Status: DISCONTINUED | OUTPATIENT
Start: 2025-03-05 | End: 2025-03-06

## 2025-03-05 RX ORDER — NICOTINE POLACRILEX 4 MG/1
1 GUM, CHEWING ORAL DAILY
Refills: 0 | Status: DISCONTINUED | OUTPATIENT
Start: 2025-03-05 | End: 2025-03-06

## 2025-03-05 RX ORDER — GLUCAGON 3 MG/1
1 POWDER NASAL ONCE
Refills: 0 | Status: DISCONTINUED | OUTPATIENT
Start: 2025-03-05 | End: 2025-03-06

## 2025-03-05 RX ORDER — DEXTROSE 50 % IN WATER 50 %
25 SYRINGE (ML) INTRAVENOUS ONCE
Refills: 0 | Status: DISCONTINUED | OUTPATIENT
Start: 2025-03-05 | End: 2025-03-06

## 2025-03-05 RX ORDER — AMLODIPINE BESYLATE 10 MG/1
10 TABLET ORAL DAILY
Refills: 0 | Status: DISCONTINUED | OUTPATIENT
Start: 2025-03-06 | End: 2025-03-06

## 2025-03-05 RX ORDER — INSULIN LISPRO 100 U/ML
INJECTION, SOLUTION INTRAVENOUS; SUBCUTANEOUS AT BEDTIME
Refills: 0 | Status: DISCONTINUED | OUTPATIENT
Start: 2025-03-05 | End: 2025-03-06

## 2025-03-05 RX ORDER — ATORVASTATIN CALCIUM 80 MG/1
40 TABLET, FILM COATED ORAL AT BEDTIME
Refills: 0 | Status: DISCONTINUED | OUTPATIENT
Start: 2025-03-05 | End: 2025-03-06

## 2025-03-05 RX ORDER — ASPIRIN 325 MG
81 TABLET ORAL DAILY
Refills: 0 | Status: DISCONTINUED | OUTPATIENT
Start: 2025-03-06 | End: 2025-03-06

## 2025-03-05 RX ORDER — SODIUM CHLORIDE 9 G/1000ML
1000 INJECTION, SOLUTION INTRAVENOUS
Refills: 0 | Status: DISCONTINUED | OUTPATIENT
Start: 2025-03-05 | End: 2025-03-06

## 2025-03-05 RX ORDER — CLOPIDOGREL BISULFATE 75 MG/1
75 TABLET, FILM COATED ORAL DAILY
Refills: 0 | Status: DISCONTINUED | OUTPATIENT
Start: 2025-03-06 | End: 2025-03-06

## 2025-03-05 RX ORDER — HYDROXYZINE HYDROCHLORIDE 25 MG/1
25 TABLET, FILM COATED ORAL DAILY
Refills: 0 | Status: DISCONTINUED | OUTPATIENT
Start: 2025-03-05 | End: 2025-03-06

## 2025-03-05 RX ADMIN — INSULIN LISPRO 2: 100 INJECTION, SOLUTION INTRAVENOUS; SUBCUTANEOUS at 18:12

## 2025-03-05 RX ADMIN — Medication 40 MILLIGRAM(S): at 21:58

## 2025-03-05 RX ADMIN — ATORVASTATIN CALCIUM 40 MILLIGRAM(S): 80 TABLET, FILM COATED ORAL at 22:06

## 2025-03-05 RX ADMIN — NICOTINE POLACRILEX 1 PATCH: 4 GUM, CHEWING ORAL at 23:28

## 2025-03-05 RX ADMIN — Medication 100 MILLILITER(S): at 17:55

## 2025-03-05 NOTE — H&P CARDIOLOGY - NSICDXPASTMEDICALHX_GEN_ALL_CORE_FT
PAST MEDICAL HISTORY:  DM (diabetes mellitus)     Eczema     GERD (gastroesophageal reflux disease)     HLD (hyperlipidemia)     HTN (hypertension)     Low back pain secondary to hip pain    OA (osteoarthritis)

## 2025-03-05 NOTE — PATIENT PROFILE ADULT - NSTOBACCOCESSATIONEDU1_GEN_A_NUR
coffee
Recognize danger situations.  For example, stress, drinking alcohol, urges to smoke, smoking cues, cigarette availability

## 2025-03-05 NOTE — PATIENT PROFILE ADULT - FALL HARM RISK - HARM RISK INTERVENTIONS
Assistance with ambulation/Assistance OOB with selected safe patient handling equipment/Communicate Risk of Fall with Harm to all staff/Monitor for mental status changes/Monitor gait and stability/Provide patient with walking aids - walker, cane, crutches/Reinforce activity limits and safety measures with patient and family/Reorient to person, place and time as needed/Review medications for side effects contributing to fall risk/Sit up slowly, dangle for a short time, stand at bedside before walking/Tailored Fall Risk Interventions/Toileting schedule using arm’s reach rule for commode and bathroom/Use of alarms - bed, chair and/or voice tab/Visual Cue: Yellow wristband and red socks/Bed in lowest position, wheels locked, appropriate side rails in place/Call bell, personal items and telephone in reach/Instruct patient to call for assistance before getting out of bed or chair/Non-slip footwear when patient is out of bed/[place] to call system/Physically safe environment - no spills, clutter or unnecessary equipment/Purposeful Proactive Rounding/Room/bathroom lighting operational, light cord in reach
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
## 2025-03-05 NOTE — CHART NOTE - NSCHARTNOTEFT_GEN_A_CORE
71 y/o male with a PMHx of HTN, HLD, DM, tobacco abuse, and GERD is s/p cardiac catherization on 03/05/2025. Pt was seen at bedside for right radial site check. Dressing in place, dry and intact. Normal pulses and  strength, normal sensation. No surrounding erythema or bleeding. Denies CP, SOB, numbness, tingling, or other complaints.     Demetrius Vivas  Department of Medicine

## 2025-03-05 NOTE — H&P CARDIOLOGY - PATIENT'S SEXUAL ORIENTATION
Chief C/o:    Cough (Pt. c/o a cough with green phlegm, SOB, nasal congestion, nasal drip, sinus pressure in face and head, pain in (B) eyes, chills, diarrhea and body aches, x 2 days.)        Health Care Maintenance    Health Maintenance         Date Due Completion Date    Shingles Vaccine (1 of 2) Never done ---    Mammogram 03/02/2022 3/2/2021    COVID-19 Vaccine (5 - Booster) 03/29/2022 2/1/2022    Influenza Vaccine (1) Never done ---    Cervical Cancer Screening 08/13/2023 8/13/2020    Lipid Panel 02/23/2026 2/23/2021    TETANUS VACCINE 02/15/2031 2/15/2021    Colorectal Cancer Screening 03/08/2031 3/8/2021                   HISTORY OF PRESENT ILLNESS:    KM Centeno is a 60 y.o. female who presents to the clinic today for Cough (Pt. c/o a cough with green phlegm, SOB, nasal congestion, nasal drip, sinus pressure in face and head, pain in (B) eyes, chills, diarrhea and body aches, x 2 days.)  .  History of contact with patients with proven influenza.                  ALLERGIES AND MEDICATIONS: updated and reviewed.  Review of patient's allergies indicates:   Allergen Reactions    Codeine      Medication List with Changes/Refills   New Medications    OSELTAMIVIR (TAMIFLU) 75 MG CAPSULE    Take 1 capsule (75 mg total) by mouth 2 (two) times daily. for 5 days    PROMETHAZINE-DEXTROMETHORPHAN (PROMETHAZINE-DM) 6.25-15 MG/5 ML SYRP    Take 5 mLs by mouth every 6 (six) hours as needed.   Current Medications    LEVOCETIRIZINE (XYZAL) 5 MG TABLET    Take 5 mg by mouth every evening.       Problem List:  Patient Active Problem List   Diagnosis    Palpitations    Easy bruising    Paresthesia of left upper extremity    Marijuana use    Abnormal EKG    BMI 24.0-24.9, adult    Elevated BP without diagnosis of hypertension         CARE TEAM:    Patient Care Team:  Josefina Cagle MD as PCP - General (Internal Medicine)  Tyrone Castro MD as Obstetrician (Obstetrics and Gynecology)         REVIEW OF  "SYSTEMS:    Review of Systems   Constitutional:  Positive for chills, fatigue and fever. Negative for appetite change, diaphoresis and unexpected weight change.   HENT:  Positive for congestion, sinus pressure, sinus pain and sore throat. Negative for ear discharge, ear pain, facial swelling, mouth sores, nosebleeds, rhinorrhea, sneezing, tinnitus, trouble swallowing and voice change.    Eyes:  Negative for pain, discharge, redness, itching and visual disturbance.   Respiratory:  Positive for cough. Negative for choking, chest tightness, shortness of breath, wheezing and stridor.    Cardiovascular:  Negative for chest pain, palpitations and leg swelling.   Gastrointestinal:  Negative for abdominal distention, abdominal pain, anal bleeding, blood in stool, constipation, diarrhea, nausea and vomiting.   Endocrine: Negative for cold intolerance, heat intolerance, polydipsia, polyphagia and polyuria.   Genitourinary:  Negative for decreased urine volume, difficulty urinating, dysuria, flank pain, frequency, hematuria and urgency.   Musculoskeletal:  Negative for arthralgias, back pain, gait problem, joint swelling, myalgias, neck pain and neck stiffness.   Skin:  Negative for color change, rash and wound.   Allergic/Immunologic: Negative for environmental allergies, food allergies and immunocompromised state.   Neurological:  Positive for headaches. Negative for dizziness, tremors, seizures, syncope, speech difficulty, light-headedness and numbness.   Hematological:  Negative for adenopathy. Does not bruise/bleed easily.   Psychiatric/Behavioral:  Negative for agitation, behavioral problems, confusion, decreased concentration, dysphoric mood, hallucinations, sleep disturbance and suicidal ideas. The patient is not nervous/anxious.        PHYSICAL EXAM:    Vitals:    11/03/22 1114   BP: 117/79   Pulse: (!) 111   Temp: 98.1 °F (36.7 °C)     Weight: 65.2 kg (143 lb 10.1 oz)   Height: 5' 4.57" (164 cm)   Body mass index is " "24.22 kg/m².  Vitals:    11/03/22 1114   BP: 117/79   Pulse: (!) 111   Temp: 98.1 °F (36.7 °C)   TempSrc: Temporal   SpO2: 97%   Weight: 65.2 kg (143 lb 10.1 oz)   Height: 5' 4.57" (1.64 m)   PainSc: 0-No pain          Physical Exam  Vitals and nursing note reviewed.   Constitutional:       General: She is not in acute distress.     Appearance: She is not ill-appearing, toxic-appearing or diaphoretic.      Comments: Patient is alert, awake and oriented X 3.  Patient is comfortable, cooperative and in no apparent distress.  Overweight.     HENT:      Head: Normocephalic and atraumatic.      Right Ear: Tympanic membrane, ear canal and external ear normal. There is no impacted cerumen.      Left Ear: Tympanic membrane, ear canal and external ear normal. There is no impacted cerumen.      Nose: Congestion present. No rhinorrhea.      Mouth/Throat:      Mouth: Mucous membranes are moist.      Pharynx: Oropharynx is clear. Posterior oropharyngeal erythema present. No oropharyngeal exudate.   Eyes:      General: No scleral icterus.        Right eye: No discharge.         Left eye: No discharge.      Extraocular Movements: Extraocular movements intact.      Conjunctiva/sclera: Conjunctivae normal.      Pupils: Pupils are equal, round, and reactive to light.   Cardiovascular:      Rate and Rhythm: Normal rate and regular rhythm.      Pulses: Normal pulses.      Heart sounds: Normal heart sounds. No murmur heard.    No friction rub. No gallop.   Pulmonary:      Effort: Pulmonary effort is normal. No respiratory distress.      Breath sounds: Normal breath sounds. No stridor. No wheezing, rhonchi or rales.   Chest:      Chest wall: No tenderness.   Abdominal:      General: Bowel sounds are normal. There is no distension.      Palpations: Abdomen is soft. There is no mass.      Tenderness: There is no abdominal tenderness. There is no guarding or rebound.      Hernia: No hernia is present.   Musculoskeletal:         General: No " swelling, tenderness, deformity or signs of injury. Normal range of motion.      Cervical back: Normal range of motion and neck supple. No rigidity.      Right lower leg: No edema.      Left lower leg: No edema.   Lymphadenopathy:      Cervical: No cervical adenopathy.   Skin:     General: Skin is warm and dry.      Capillary Refill: Capillary refill takes less than 2 seconds.      Coloration: Skin is not jaundiced or pale.      Findings: No bruising, erythema, lesion or rash.   Neurological:      General: No focal deficit present.      Mental Status: She is alert and oriented to person, place, and time.      Cranial Nerves: No cranial nerve deficit.      Sensory: No sensory deficit.      Motor: No weakness.      Coordination: Coordination normal.      Deep Tendon Reflexes: Reflexes normal.   Psychiatric:         Mood and Affect: Mood normal.         Behavior: Behavior normal.         Thought Content: Thought content normal.         Judgment: Judgment normal.          Labs:    Lab Results   Component Value Date    GLU 97 02/23/2021     02/23/2021    K 3.8 02/23/2021     02/23/2021    CO2 29 02/23/2021    BUN 15 02/23/2021    BUN 9 12/03/2015    CREATININE 0.78 02/23/2021    CALCIUM 9.7 02/23/2021    PROT 7.4 02/23/2021    ALBUMIN 4.6 02/23/2021    BILITOT 0.9 02/23/2021    AST 14 02/23/2021    ALT 13 02/23/2021    ANIONGAP 18 12/03/2015    ESTGFRAFRICA 98 02/23/2021    EGFRNONAA 85 02/23/2021     Lab Results   Component Value Date    WBC 4.4 02/23/2021    RBC 4.37 02/23/2021    HGB 13.8 02/23/2021    HCT 40.5 02/23/2021    MCV 92.7 02/23/2021    RDW 11.9 02/23/2021    PLT TNP 02/23/2021      Lab Results   Component Value Date    CHOL 179 02/23/2021    TRIG 63 02/23/2021    HDL 69 02/23/2021    LDLCALC 95 02/23/2021    LDLCALC 101 12/03/2015     Lab Results   Component Value Date    TSH 2.16 02/23/2021     No results found for: HGBA1C, ESTIMATEDAVG   No components found for:  MICROALBUMIN/CREATININE    ASSESSMENT & PLAN:    1. Acute URI  - promethazine-dextromethorphan (PROMETHAZINE-DM) 6.25-15 mg/5 mL Syrp; Take 5 mLs by mouth every 6 (six) hours as needed.  Dispense: 180 mL; Refill: 0  Patient was advised to keep well hydrated, and avoid dehydration.  Patient may take Tylenol 650 mg q.6 hours p.r.n. for pain or fever.  Patient may contact us for any questions or new issues.  Patient was advised to go to the emergency room for any distress or new symptoms.    2. Diarrhea of presumed infectious origin  See above  3. Influenza  - oseltamivir (TAMIFLU) 75 MG capsule; Take 1 capsule (75 mg total) by mouth 2 (two) times daily. for 5 days  Dispense: 10 capsule; Refill: 0  - promethazine-dextromethorphan (PROMETHAZINE-DM) 6.25-15 mg/5 mL Syrp; Take 5 mLs by mouth every 6 (six) hours as needed.  Dispense: 180 mL; Refill: 0    4. BMI 24.0-24.9, adult     Patient presenting with symptoms consistent with influenza syndrome, with upper respiratory infection and diarrhea, keep well hydrated, take the above medication and observe, to go to the emergency room for any distress.  Follow-up as needed and or as scheduled      No orders of the defined types were placed in this encounter.     No follow-ups on file. or sooner as needed.    Patient was counseled and questions and concerns were addressed.    Please note:  Parts of this report were done using a dictation software, voice to text, and sometimes the text contains some uncorrected words or sentences that are missed during revision.     Heterosexual

## 2025-03-05 NOTE — H&P CARDIOLOGY - HISTORY OF PRESENT ILLNESS
69 y/o male with a PMHx of HTN, HLD, DM and GERD presents for elective cardiac catheterization.     Cardiologist: Dr. Maribel Meyer 71 y/o male with a PMHx of HTN, HLD, DM, tobacco abuse, and GERD presents for elective cardiac catheterization. Pt has been experiencing dyspnea on exertion for the past several years. Pt reports that his symptoms are worse with exertion and better with rest. In 2022, pt saw his cardiologist, Dr. Meyer, and underwent echocardiogram and nuclear stress test, which was reportedly normal. Since then, pt continues to experience dyspnea on exertion without chest pain. Pt believes his symptoms are progressively worsening. Pt returned to his cardiologist and underwent a coronary CT angiogram, which revealed a calcium score of 2666 with concern for severe three-vessel CAD. Pt denies fever, chills, recent travel, headache, dizziness, visual deficits, chest pain, orthopnea, palpitations, abdominal pain, N/V/D/C, hematochezia, melena, dysuria, hematuria, LOC, syncope, peripheral edema. In light of patients cardiac risk factors, symptoms, and abnormal noninvasive test findings, there is high suspicion for CAD. Patient is now referred to [facility] for a cardiac catheterization with possible PTCA/stent.     Cardiologist: Dr. Maribel Meyer

## 2025-03-05 NOTE — H&P CARDIOLOGY - COMMENTS
Pre Procedural Sedation Evaluation    Urine pregnancy: N/A  Dentures: Uppers/lowers  Last PO intake: 3/5/2025 at 06:30AM  Obstructive sleep apnea: No  Aspiration risk: No  Mallampati score: 2  ASA Classification: 2  Prior Sedative or Anesthesia Experience: No complications  Informed consent by responsible adult: Yes  Responsible adult escort: Yes  Based on today's assessment, anesthesia consult requested: No

## 2025-03-06 VITALS
DIASTOLIC BLOOD PRESSURE: 77 MMHG | TEMPERATURE: 98 F | OXYGEN SATURATION: 99 % | HEART RATE: 62 BPM | RESPIRATION RATE: 18 BRPM | SYSTOLIC BLOOD PRESSURE: 139 MMHG

## 2025-03-06 DIAGNOSIS — I25.10 ATHEROSCLEROTIC HEART DISEASE OF NATIVE CORONARY ARTERY WITHOUT ANGINA PECTORIS: ICD-10-CM

## 2025-03-06 PROBLEM — M19.011 PRIMARY OSTEOARTHRITIS, RIGHT SHOULDER: Chronic | Status: INACTIVE | Noted: 2018-08-09 | Resolved: 2025-03-05

## 2025-03-06 LAB
ANION GAP SERPL CALC-SCNC: 13 MMOL/L — SIGNIFICANT CHANGE UP (ref 7–14)
BASOPHILS # BLD AUTO: 0.03 K/UL — SIGNIFICANT CHANGE UP (ref 0–0.2)
BASOPHILS NFR BLD AUTO: 0.3 % — SIGNIFICANT CHANGE UP (ref 0–2)
BUN SERPL-MCNC: 14 MG/DL — SIGNIFICANT CHANGE UP (ref 7–23)
CALCIUM SERPL-MCNC: 9.9 MG/DL — SIGNIFICANT CHANGE UP (ref 8.4–10.5)
CHLORIDE SERPL-SCNC: 100 MMOL/L — SIGNIFICANT CHANGE UP (ref 98–107)
CO2 SERPL-SCNC: 23 MMOL/L — SIGNIFICANT CHANGE UP (ref 22–31)
CREAT SERPL-MCNC: 0.69 MG/DL — SIGNIFICANT CHANGE UP (ref 0.5–1.3)
EGFR: 101 ML/MIN/1.73M2 — SIGNIFICANT CHANGE UP
EGFR: 101 ML/MIN/1.73M2 — SIGNIFICANT CHANGE UP
EOSINOPHIL # BLD AUTO: 0.31 K/UL — SIGNIFICANT CHANGE UP (ref 0–0.5)
EOSINOPHIL NFR BLD AUTO: 3.1 % — SIGNIFICANT CHANGE UP (ref 0–6)
GLUCOSE SERPL-MCNC: 143 MG/DL — HIGH (ref 70–99)
HCT VFR BLD CALC: 38.3 % — LOW (ref 39–50)
HGB BLD-MCNC: 13.2 G/DL — SIGNIFICANT CHANGE UP (ref 13–17)
IANC: 5.54 K/UL — SIGNIFICANT CHANGE UP (ref 1.8–7.4)
IMM GRANULOCYTES NFR BLD AUTO: 0.4 % — SIGNIFICANT CHANGE UP (ref 0–0.9)
LYMPHOCYTES # BLD AUTO: 3.2 K/UL — SIGNIFICANT CHANGE UP (ref 1–3.3)
LYMPHOCYTES # BLD AUTO: 32.1 % — SIGNIFICANT CHANGE UP (ref 13–44)
MAGNESIUM SERPL-MCNC: 1.8 MG/DL — SIGNIFICANT CHANGE UP (ref 1.6–2.6)
MCHC RBC-ENTMCNC: 28.6 PG — SIGNIFICANT CHANGE UP (ref 27–34)
MCHC RBC-ENTMCNC: 34.5 G/DL — SIGNIFICANT CHANGE UP (ref 32–36)
MCV RBC AUTO: 82.9 FL — SIGNIFICANT CHANGE UP (ref 80–100)
MONOCYTES # BLD AUTO: 0.84 K/UL — SIGNIFICANT CHANGE UP (ref 0–0.9)
MONOCYTES NFR BLD AUTO: 8.4 % — SIGNIFICANT CHANGE UP (ref 2–14)
NEUTROPHILS # BLD AUTO: 5.54 K/UL — SIGNIFICANT CHANGE UP (ref 1.8–7.4)
NEUTROPHILS NFR BLD AUTO: 55.7 % — SIGNIFICANT CHANGE UP (ref 43–77)
NRBC # BLD AUTO: 0 K/UL — SIGNIFICANT CHANGE UP (ref 0–0)
NRBC # FLD: 0 K/UL — SIGNIFICANT CHANGE UP (ref 0–0)
NRBC BLD AUTO-RTO: 0 /100 WBCS — SIGNIFICANT CHANGE UP (ref 0–0)
PHOSPHATE SERPL-MCNC: 2.9 MG/DL — SIGNIFICANT CHANGE UP (ref 2.5–4.5)
PLATELET # BLD AUTO: 298 K/UL — SIGNIFICANT CHANGE UP (ref 150–400)
POTASSIUM SERPL-MCNC: 3.6 MMOL/L — SIGNIFICANT CHANGE UP (ref 3.5–5.3)
POTASSIUM SERPL-SCNC: 3.6 MMOL/L — SIGNIFICANT CHANGE UP (ref 3.5–5.3)
RBC # BLD: 4.62 M/UL — SIGNIFICANT CHANGE UP (ref 4.2–5.8)
RBC # FLD: 14.6 % — HIGH (ref 10.3–14.5)
SODIUM SERPL-SCNC: 136 MMOL/L — SIGNIFICANT CHANGE UP (ref 135–145)
WBC # BLD: 9.96 K/UL — SIGNIFICANT CHANGE UP (ref 3.8–10.5)
WBC # FLD AUTO: 9.96 K/UL — SIGNIFICANT CHANGE UP (ref 3.8–10.5)

## 2025-03-06 PROCEDURE — 99232 SBSQ HOSP IP/OBS MODERATE 35: CPT

## 2025-03-06 RX ORDER — CLOPIDOGREL BISULFATE 75 MG/1
1 TABLET, FILM COATED ORAL
Qty: 90 | Refills: 0
Start: 2025-03-06 | End: 2025-06-03

## 2025-03-06 RX ORDER — ASPIRIN 325 MG
1 TABLET ORAL
Qty: 90 | Refills: 0
Start: 2025-03-06 | End: 2025-06-03

## 2025-03-06 RX ORDER — ATORVASTATIN CALCIUM 80 MG/1
1 TABLET, FILM COATED ORAL
Refills: 0 | DISCHARGE

## 2025-03-06 RX ORDER — NICOTINE POLACRILEX 4 MG/1
1 GUM, CHEWING ORAL
Qty: 2 | Refills: 0
Start: 2025-03-06 | End: 2025-04-04

## 2025-03-06 RX ORDER — METFORMIN HYDROCHLORIDE 850 MG/1
1 TABLET ORAL
Qty: 0 | Refills: 0 | DISCHARGE

## 2025-03-06 RX ORDER — ATORVASTATIN CALCIUM 80 MG/1
1 TABLET, FILM COATED ORAL
Qty: 30 | Refills: 0
Start: 2025-03-06 | End: 2025-04-04

## 2025-03-06 RX ADMIN — Medication 3 MILLILITER(S): at 05:56

## 2025-03-06 RX ADMIN — AMLODIPINE BESYLATE 10 MILLIGRAM(S): 10 TABLET ORAL at 05:25

## 2025-03-06 RX ADMIN — INSULIN LISPRO 1: 100 INJECTION, SOLUTION INTRAVENOUS; SUBCUTANEOUS at 12:42

## 2025-03-06 RX ADMIN — Medication 160 MILLIGRAM(S): at 05:25

## 2025-03-06 RX ADMIN — CLOPIDOGREL BISULFATE 75 MILLIGRAM(S): 75 TABLET, FILM COATED ORAL at 11:37

## 2025-03-06 RX ADMIN — Medication 81 MILLIGRAM(S): at 11:37

## 2025-03-06 RX ADMIN — Medication 3 MILLILITER(S): at 13:29

## 2025-03-06 RX ADMIN — Medication 1 APPLICATION(S): at 12:45

## 2025-03-06 RX ADMIN — Medication 40 MILLIGRAM(S): at 05:25

## 2025-03-06 RX ADMIN — Medication 3 MILLILITER(S): at 00:59

## 2025-03-06 RX ADMIN — NICOTINE POLACRILEX 1 PATCH: 4 GUM, CHEWING ORAL at 11:36

## 2025-03-06 NOTE — PROGRESS NOTE ADULT - ASSESSMENT
69 y/o male with a PMHx of HTN, HLD, DM, tobacco abuse, and GERD presents for elective cardiac catheterization. Pt has been experiencing dyspnea on exertion for the past several years. Pt reports that his symptoms are worse with exertion and better with rest. In 2022, pt saw his cardiologist, Dr. Meyer, and underwent echocardiogram and nuclear stress test, which was reportedly normal. Since then, pt continues to experience dyspnea on exertion without chest pain. Pt believes his symptoms are progressively worsening. Pt returned to his cardiologist and underwent a coronary CT angiogram, which revealed a calcium score of 2666 with concern for severe three-vessel CAD. Pt denies fever, chills, recent travel, headache, dizziness, visual deficits, chest pain, orthopnea, palpitations, abdominal pain, N/V/D/C, hematochezia, melena, dysuria, hematuria, LOC, syncope, peripheral edema. In light of patients cardiac risk factors, symptoms, and abnormal noninvasive test findings, there is high suspicion for CAD. Patient is now referred to [facility] for a cardiac catheterization with possible PTCA/stent.  69 y/o male with a PMHx of HTN, HLD, DM, tobacco abuse, and GERD presents for elective cardiac catheterization in the setting of dyspnea on exertion with outpatient coronary CT revealing a calcium score of 2666 with concern for severe three-vessel CAD, now s/p LHC with MAYR ANN x 1 to RCA.

## 2025-03-06 NOTE — DISCHARGE NOTE PROVIDER - NSDCMRMEDTOKEN_GEN_ALL_CORE_FT
amLODIPine 10 mg oral tablet: 1 tab(s) orally once a day  aspirin 81 mg oral delayed release tablet: 1 tab(s) orally once a day  atorvastatin 40 mg oral tablet: 1 tab(s) orally once a day (at bedtime)  clopidogrel 75 mg oral tablet: 1 tab(s) orally once a day  hydrOXYzine hydrochloride 25 mg oral tablet: 1 tab(s) orally once a day as needed for  itching  metFORMIN 500 mg oral tablet, extended release: 1 tab(s) orally once a day Resume on 2/9/25  NexIUM 40 mg oral delayed release capsule: 1 cap(s) orally once a day  nicotine 7 mg/24 hr transdermal film, extended release: 1 patch transdermal once a day as needed for smoking cessation  valsartan 160 mg oral tablet: 1 tab(s) orally once a day

## 2025-03-06 NOTE — PROGRESS NOTE ADULT - SUBJECTIVE AND OBJECTIVE BOX
Subjective: Pt seen and evaluated at bedside with no complaints. Pt denies chest pain, shortness of breath, palpitations, diaphoresis, nausea and vomiting. No events overnight.    Objective:    Medications:  amLODIPine Tablet 10 milliGRAM(s) Oral daily  aspirin enteric coated 81 milliGRAM(s) Oral daily  atorvastatin 40 milliGRAM(s) Oral at bedtime  clopidogrel Tablet 75 milliGRAM(s) Oral daily  dextrose 5% 1000 milliLiter(s) IV Continuous <Continuous>  dextrose 5% 1000 milliLiter(s) IV Continuous <Continuous>  dextrose 50% Injectable 25 Gram(s) IV Push once  dextrose 50% Injectable 12.5 Gram(s) IV Push once  dextrose 50% Injectable 25 Gram(s) IV Push once  dextrose Oral Gel 15 Gram(s) Oral once PRN  glucagon Injectable 1 milliGRAM(s) IntraMuscular once  hydrOXYzine hydrochloride 25 milliGRAM(s) Oral daily PRN  insulin lispro (ADMELOG) corrective regimen sliding scale   SubCutaneous three times a day before meals  insulin lispro (ADMELOG) corrective regimen sliding scale   SubCutaneous at bedtime  nicotine -   7 mG/24Hr(s) Patch 1 Patch Transdermal daily  pantoprazole Tablet 40 milliGRAM(s) Oral before breakfast  sodium chloride 0.9% lock flush 3 milliLiter(s) IV Push every 8 hours  sodium chloride 0.9%. 1000 milliLiter(s) IV Continuous <Continuous>  valsartan 160 milliGRAM(s) Oral daily      Vitals:  Vital Signs Last 24 Hrs  T(C): 36.9 (06 Mar 2025 04:30), Max: 36.9 (05 Mar 2025 14:50)  T(F): 98.4 (06 Mar 2025 04:30), Max: 98.5 (05 Mar 2025 20:30)  HR: 67 (06 Mar 2025 05:20) (62 - 82)  BP: 140/76 (06 Mar 2025 05:20) (139/74 - 179/104)  BP(mean): --  RR: 16 (06 Mar 2025 04:30) (12 - 23)  SpO2: 100% (06 Mar 2025 04:30) (96% - 100%)    Parameters below as of 06 Mar 2025 04:30  Patient On (Oxygen Delivery Method): room air    Daily Height in cm: 170.18 (05 Mar 2025 11:52)    Daily Weight in k.7 (05 Mar 2025 11:52)      Physical Exam:     General: No distress. Comfortable.  HEENT: EOM intact.  Neck: Neck supple. JVP not elevated.  Chest: Clear to auscultation bilaterally. No rales, rhonchi, or wheezing noted.  CV: Normal S1 and S2. No murmurs, rub, or gallops.  Abdomen: Soft. Non-tender. Non-distended. Normal bowel sounds.  Skin: No rashes or skin breakdown. No peripheral edema.  Neurology: Alert and oriented times three.  Psych: Affect normal.  Access site: Right radial dressing clean, dry, intact with no active bleeding, edema or hematoma. Right upper extremity warm with good pulses, sensation and capillary refill.    Labs:                        13.2   9.96  )-----------( 298      ( 06 Mar 2025 04:30 )             38.3     03-06    136  |  100  |  14  ----------------------------<  143[H]  3.6   |  23  |  0.69    Ca    9.9      06 Mar 2025 04:30  Phos  2.9     03-06  Mg     1.80     03-06    TPro  8.1  /  Alb  4.4  /  TBili  0.3  /  DBili  x   /  AST  28  /  ALT  23  /  AlkPhos  68  03-05      NSR at 64 bpm, no ischemic changes    Telemetry: Sinus rhythm, no events    Diagnostic Imaging:    3/5 - Cardiac catheterization: RCA 80% s/p MARY ANN x 1. LCx 99% with partial collaterals. mid LAD moderate s/p FFR 0.86. RRA access. ASA and Plavix.

## 2025-03-06 NOTE — PHYSICAL THERAPY INITIAL EVALUATION ADULT - PATIENT PROFILE REVIEW, REHAB EVAL
ACTIVITY ORDER: Increase as Tolerated; Spoke with RN Jane Maria prior to PT evaluation-->Pt OK for PT consult/OOB activity./yes

## 2025-03-06 NOTE — PHYSICAL THERAPY INITIAL EVALUATION ADULT - ADDITIONAL COMMENTS
Pt lives in a private house with his wife and son with 3-4 steps to enter; no handrails; and a flight of stairs to negotiate to bedroom; (+)1 handrail. Prior to hospital admission, pt was completely independent and used no assistive device with ambulation. He does own a rolling walker if he needs from his right total hip surgery. Pt denies any recent falls.    Pt left comfortable in bed, NAD, all lines intact, all precautions maintained, with call salazar in reach, and GRACE Lara aware.

## 2025-03-06 NOTE — DISCHARGE NOTE PROVIDER - CARE PROVIDER_API CALL
Maribel Meyer  Cardiology  [address] [place] [address]  Phone: [phone]  Fax: [phone]  Follow Up Time: 1 week    Alize Rosenbaum  Internal Medicine  [address] [place] [address]  Phone: [phone]  Fax: [phone]  Follow Up Time: 1 week

## 2025-03-06 NOTE — DISCHARGE NOTE PROVIDER - HOSPITAL COURSE
70M PMHx HTN, HLD, DM, tobacco abuse, and GERD presents for elective cardiac catheterization. Pt has been experiencing dyspnea on exertion for the past several years. Pt reports that his symptoms are worse with exertion and better with rest. In 2022, pt saw his cardiologist, Dr. Meyer, and underwent echocardiogram and nuclear stress test, which was reportedly normal. Since then, pt continues to experience dyspnea on exertion without chest pain. Pt believes his symptoms are progressively worsening. Pt returned to his cardiologist and underwent a coronary CT angiogram, which revealed a calcium score of 2666 with concern for severe three-vessel CAD. S/p LHC on 3/6 revealed RCA 80% s/p MARY ANN x 1, LCx 99% with partial collaterals, and moderate mid LAD stenosis s/p FFR 0.86 (negative). Patient to be discharged on ASA/ plavix. RRA dressing C/D/I with minimal swelling, stable.  Patient to return for staged PCI to LCx as outpatient. Outpatient cardiology follow up. Case discussed with cardiology cath PA.  No PT needs.  Medications discussed with patient and sent to a mutually agreed upon pharmacy. 70M PMHx HTN, HLD, DM, tobacco abuse, and GERD presents for elective cardiac catheterization. Pt has been experiencing dyspnea on exertion for the past several years. Pt reports that his symptoms are worse with exertion and better with rest. In 2022, pt saw his cardiologist, Dr. Meyer, and underwent echocardiogram and nuclear stress test, which was reportedly normal. Since then, pt continues to experience dyspnea on exertion without chest pain. Pt believes his symptoms are progressively worsening. Pt returned to his cardiologist and underwent a coronary CT angiogram, which revealed a calcium score of 2666 with concern for severe three-vessel CAD. S/p LHC on 3/6 revealed RCA 80% s/p MARY ANN x 1, LCx 99% with partial collaterals, and moderate mid LAD stenosis s/p FFR 0.86 (negative). Patient to be discharged on ASA/ plavix. RRA dressing C/D/I with minimal swelling, stable.  Patient to return for staged PCI to LCx as outpatient. Outpatient cardiology follow up. Case discussed with cardiology cath PA.  No PT needs. Counseled on smoking cessation, nicotine patches prescribed on discharge. Medications discussed with patient and sent to a mutually agreed upon pharmacy.

## 2025-03-06 NOTE — PHARMACOTHERAPY INTERVENTION NOTE - COMMENTS
Discharge medications reviewed with the patient. Current medication schedule was discussed in detail including: medication name, indication, dose, administration times, treatment duration, side effects, and special instructions. Questions and concerns were answered and addressed. Patient demonstrated understanding. Patient provided with educational handouts on nicotine patches, aspirin, and clopidogrel.      Gosia Nagel, PharmD   PGY-1 Pharmacy Resident

## 2025-03-06 NOTE — PATIENT PROFILE ADULT - MONEY FOR FOOD
Pt arrived to [facility] today with NPWT VAC off, pt removed VAC prior to arrival to take a shower. CG had dressing wound with iodoform and secured with silicone bordered dressing.  Removed dressing, wound cleansed with NS and gauze, queenie-wound prepped with skin prep and transparent film, single layer of collagen ag placed to wound base, wound cavity filled with black foam and tracked to midline back, covered with transparent drape. NPWT set to 125mmHg continuous suction. Pt tolerated well.     Wound Location: left gluteal    Wound Exudate: moderate  Wound Debridement: Mechanically debrided with normal saline & gauze  Wound Thickness: full    Cleanse with:  NS or antibacterial soap and water    Primary: Iodoform  Secondary: silicone bordered dressing  Secured with:   Queenie-wound:    Instructions: When vac is dislodged or unable to maintain seal, cleanse wound with NS, lightly pack wound with iodoform packing, cover and secure with silicone bordered dressing.      Education: Educated patient/family member/CG on how to apply dressing if vac not in place, verbal understanding provided. Patient/family member/CG instructed to call with any questions or concerns.   no

## 2025-03-06 NOTE — PROGRESS NOTE ADULT - PROBLEM SELECTOR PLAN 1
- Pt hemodynamically stable without chest pain or shortness of breath  - EKG/telemetry shows NSR with no ischemic changes and no events  - Labs reviewed and unremarkable  - s/p LHC on 3/6 revealing RCA 80% s/p MARY ANN x 1, LCx 99% with partial collaterals, and moderate mid LAD stenosis s/p FFR 0.86 (negative)  - Will return for staged PCI to LCx as outpatient  - Continue Aspirin 81mg daily and Plavix 75mg daily  - Please send at least 3 month supply of Plavix to patient's preferred pharmacy  - Atorvastatin increased to 40mg daily  - Continue Valsartan 160mg daily  - Right radial dressing clean, dry, intact with no active bleeding, edema or hematoma; right upper extremity warm with good pulses, sensation and capillary refill  - Cardiac rehab referral not given to patient as patient is pending staged PCI to LCx as outpatient  - Education provided on smoking cessation  - Pt medically stable for discharge home today  - Follow up with Maribel Meyer as outpatient - Pt hemodynamically stable without chest pain or shortness of breath  - EKG/telemetry shows NSR with no ischemic changes and no events  - Labs reviewed and unremarkable  - s/p LHC on 3/6 revealing RCA 80% s/p MARY ANN x 1, LCx 99% with partial collaterals, and moderate mid LAD stenosis s/p FFR 0.86 (negative)  - Will return for staged PCI to LCx as outpatient  - Continue Aspirin 81mg daily and Plavix 75mg daily  - Please send at least 3 month supply of Plavix to patient's preferred pharmacy  - Atorvastatin increased to 40mg daily  - Continue Valsartan 160mg daily  - Hold Metformin x 72 hours  - Right radial dressing clean, dry, intact with no active bleeding, edema or hematoma; right upper extremity warm with good pulses, sensation and capillary refill  - Cardiac rehab referral not given to patient as patient is pending staged PCI to LCx as outpatient  - Education provided on smoking cessation  - Pt medically stable for discharge home today  - Follow up with Maribel Meyer as outpatient

## 2025-03-06 NOTE — DISCHARGE NOTE NURSING/CASE MANAGEMENT/SOCIAL WORK - FINANCIAL ASSISTANCE
[facility] provides services at a reduced cost to those who are determined to be eligible through [facility]’s financial assistance program. Information regarding [facility]’s financial assistance program can be found by going to [email or web address] or by calling [phone].

## 2025-03-06 NOTE — PHYSICAL THERAPY INITIAL EVALUATION ADULT - GENERAL OBSERVATIONS, REHAB EVAL
Pt encountered in semisupine position, no distress, AxOx4, with +IV, and +cardiac monitor. Pt agreeable to participate in PT evaluation. Vitals taken; /86mmHg, heart rate 66bpm.

## 2025-03-06 NOTE — DISCHARGE NOTE NURSING/CASE MANAGEMENT/SOCIAL WORK - NSDCPEWEB_GEN_ALL_CORE
[facility] for Tobacco Control website --- [email or web address] website --- [email or web address]

## 2025-03-06 NOTE — PHYSICAL THERAPY INITIAL EVALUATION ADULT - ACTIVE RANGE OF MOTION EXAMINATION, REHAB EVAL
marta. upper extremity Active ROM was WNL (within normal limits)/bilateral lower extremity Active ROM was WNL (within normal limits)

## 2025-03-06 NOTE — DISCHARGE NOTE PROVIDER - NSDCCPCAREPLAN_GEN_ALL_CORE_FT
PRINCIPAL DISCHARGE DIAGNOSIS  Diagnosis: S/P cardiac cath  Assessment and Plan of Treatment: Activity:   No strenuous activity, straining, heavy lifting, pushing or pulling for 3 days.   Bandage: Keep bandage clean and dry. Remove after 24 hours.    Bathing: No baths/sitting in pools for 7 days.  Medications: Take those medicines reviewed today with you on the medication reconciliation sheet. If you had a balloon or stent procedure, DO NOT STOP taking the medicines to keep your stent open without first asking your Cardiologist.  Special Instructions: If the procedure was done in your wrist, avoid reaching or placing too much pressure on that arm or wrist for 48 hours. If the procedure was done in your leg ( groin), limit stair climbing for 48 hours.  If you have bleeding from the procedure site: Lie down and remove the bandage. Apply hard pressure and call your doctor. If bleeding and swelling cannot be controlled, call 911.  Call your doctor immediately if: 1) You have severe pain, swelling, or bruising at the procedure site. A small amount or soreness and bruising (black and blue) is normal. 2) Procedure site becomes very red or feels hot to touch. 3) Your hand becomes blue or feels cold to touch. 4)You feel sudden back or belly pain. 5)You develop fever.  Contact information: If you are unable to reach your Doctor, call the cardiology office at [facility] [phone] (Monday-Friday 8am-5pm). After 5pm and on weekends, please call , and ask for "cardiology fellow".      SECONDARY DISCHARGE DIAGNOSES  Diagnosis: CAD (coronary artery disease)  Assessment and Plan of Treatment: Please continue your statin medication to control cholesterol levels, as well as aspirin and plavix as prescribed in order to optimize your heart health. It is advised you return for staged PCI of your left circumflex artery.   Follow-up with your primary provider/cardiologist as outpatient for ongoing care. If you have persistent chest pain, shortness of breath, heart palpitations, or dizziness you should return to the emergency room.    Diagnosis: HTN (hypertension)  Assessment and Plan of Treatment: Continue valsartan as directed.  Monitor for any visual changes, headaches or dizziness.  Monitor blood pressure regularly.  Follow up with your primary care provider for further management for high blood pressure.    Diagnosis: Needs smoking cessation education  Assessment and Plan of Treatment: Nicotine patches were prescribed upon discharge. Please refrain from smoking as this can worsen your health.  Follow up with your PCP for further smoking cessation modalities.

## 2025-03-06 NOTE — DISCHARGE NOTE NURSING/CASE MANAGEMENT/SOCIAL WORK - PATIENT PORTAL LINK FT
You can access the FollowMyHealth Patient Portal offered by [facility] by registering at the following website: [email or web address]. By joining [facility]’s FollowMyHealth portal, you will also be able to view your health information using other applications (apps) compatible with our system.

## 2025-03-06 NOTE — DISCHARGE NOTE NURSING/CASE MANAGEMENT/SOCIAL WORK - CAREGIVER RELATION TO PATIENT
[de-identified] : We discussed patient following up with one of the partners to discuss TKA in the future. He is not ready for surgery at this time due to work and life constraints so we discussed proceeding with cortisone injection today. He understands that it is important to space out surgery and injection to limit infection risk. We discussed formal PT, a home exercise program, ice therapy and the role of NSAIDS for the pain. These modalities will improve the patient's functionality in their activities of daily life.  Risks, benefits and contraindications were discussed.  The patient will follow up in 6 weeks or sooner as needed. Spouse

## 2025-03-06 NOTE — DISCHARGE NOTE NURSING/CASE MANAGEMENT/SOCIAL WORK - NSDCPEFALRISK_GEN_ALL_CORE
For information on Fall & Injury Prevention, visit: [email or web address] OR  [email or web address] OR  https://www.cdc.gov/steadi/patient.html

## 2025-03-06 NOTE — DISCHARGE NOTE NURSING/CASE MANAGEMENT/SOCIAL WORK - NSDCPEEMAIL_GEN_ALL_CORE
[facility] for Tobacco Control email [email or web address]

## 2025-05-22 NOTE — ED PROCEDURE NOTE - NS ED PROCEDURE ASSISTED BY
HISTORY OF PRESENT ILLNESS:      The patient presents for an ER follow-up from a previous UTI and a recent episode of tachycardia.  She also reports experiencing a painful bump in her right ear, which occasionally results in tinnitus. There have been no recent ear piercings.      The incident that caused her to go to the ED, she describes a significant episode of discomfort characterized by numbness and an inability to form complete sentences. In the emergency room, fluids and hydroxyzine were administered, but she continued to feel unwell upon discharge. She also reported extreme fatigue and weakness. There were instances of memory lapses, but she does not recall losing consciousness. There is no known family history of epilepsy or seizures.    Intermittent coldness and numbness in her extremities have been occurring, although not constantly. During these episodes, her hands turn blue, necessitating warming them under running water. She also reports episodes of gray discoloration of her fingers, even in mild weather conditions. These episodes are accompanied by a loss of sensation and weakness in her hands, although she retains the ability to move them. She maintains adequate hydration, consuming approximately 96 ounces of water daily.    She also reported persistent flank pain has been present following a urinary tract infection (UTI), despite completing a course of antibiotics.    SOCIAL HISTORY  She admits to smoking marijuana. Denies use of stimulants/amphetamines.          I have reviewed the patient's medications and allergies, past medical, surgical, social and family history, updating these as appropriate.  See Histories section of the EMR (electronic medical record) for a display of this information.    REVIEW OF SYSTEMS: As modified in the HPI (history of present illness), otherwise: negative         PHYSICAL EXAM:    Vital Signs: Visit Vitals  /62 (BP Location: RUE - Right upper extremity, Patient  Position: Sitting)   Pulse 95   Temp 98.5 °F (36.9 °C) (Oral)   Resp 20   Ht 5' 7\" (1.702 m)   Wt 65.1 kg (143 lb 8.3 oz)   LMP 05/10/2025 (Exact Date)   SpO2 100%   BMI 22.48 kg/m²        Physical Exam  Constitutional:       Appearance: Normal appearance.   HENT:      Head: Normocephalic and atraumatic.      Right Ear: Tympanic membrane, ear canal and external ear normal.      Left Ear: Tympanic membrane, ear canal and external ear normal.      Ears:        Comments: Black head noted on external right ear, erythema present  Cardiovascular:      Rate and Rhythm: Normal rate and regular rhythm.      Pulses: Normal pulses.      Heart sounds: Normal heart sounds. No murmur heard.     No gallop.   Pulmonary:      Effort: Pulmonary effort is normal.      Breath sounds: Normal breath sounds.   Skin:     General: Skin is cool.      Capillary Refill: Capillary refill takes more than 3 seconds.      Coloration: Skin is pale.   Neurological:      General: No focal deficit present.      Mental Status: She is alert and oriented to person, place, and time.      Sensory: Sensation is intact.      Motor: No weakness.   Psychiatric:         Mood and Affect: Mood normal.         Behavior: Behavior normal.         Thought Content: Thought content normal.           LAB:   Admission on 05/20/2025, Discharged on 05/20/2025   Component Date Value Ref Range Status   • Systolic Blood Pressure 05/20/2025 155   Final   • Diastolic Blood Pressure 05/20/2025 84   Final   • Ventricular Rate EKG/Min (BPM) 05/20/2025 116   Final   • Atrial Rate (BPM) 05/20/2025 116   Final   • SD-Interval (MSEC) 05/20/2025 126   Final   • QRS-Interval (MSEC) 05/20/2025 80   Final   • QT-Interval (MSEC) 05/20/2025 314   Final   • QTc 05/20/2025 436   Final   • P Axis (Degrees) 05/20/2025 66   Final   • R Axis (Degrees) 05/20/2025 85   Final   • REPORT TEXT 05/20/2025    Final                    Value:Sinus tachycardia  T wave abnormality, consider inferior  ischemia  Abnormal ECG  When compared with ECG of  07-MAY-2025 13:51,  PREVIOUS ECG IS PRESENT  Confirmed by CLAUDIA BRAVO MD ([id number]),  Sylvia Sigala ([id number]) on 5/20/2025 2:44:19 PM     • Sodium 05/20/2025 141  135 - 145 mmol/L Final   • Potassium 05/20/2025 3.5  3.4 - 5.1 mmol/L Final   • Chloride 05/20/2025 102  97 - 110 mmol/L Final   • Carbon Dioxide 05/20/2025 21  21 - 32 mmol/L Final   • Anion Gap 05/20/2025 22 (H)  7 - 19 mmol/L Final   • Glucose 05/20/2025 92  70 - 99 mg/dL Final   • BUN 05/20/2025 12  5 - 18 mg/dL Final   • Creatinine 05/20/2025 0.45  0.39 - 0.90 mg/dL Final   • Glomerular Filtration Rate 05/20/2025    Final   • BUN/Cr 05/20/2025 27 (H)  7 - 25 Final   • Calcium 05/20/2025 9.8  8.0 - 11.0 mg/dL Final   • Bilirubin, Total 05/20/2025 0.5  0.2 - 1.0 mg/dL Final   • GOT/AST 05/20/2025 13  10 - 45 Units/L Final   • GPT/ALT 05/20/2025 20  6 - 35 Units/L Final   • Alkaline Phosphatase 05/20/2025 114  90 - 360 Units/L Final   • Albumin 05/20/2025 4.2  3.4 - 5.0 g/dL Final   • Protein, Total 05/20/2025 7.6  6.0 - 8.0 g/dL Final   • Globulin 05/20/2025 3.4  2.0 - 4.0 g/dL Final   • A/G Ratio 05/20/2025 1.2  1.0 - 2.4 Final   • COLOR, URINALYSIS 05/20/2025 Colorless   Final   • APPEARANCE, URINALYSIS 05/20/2025 Clear   Final   • GLUCOSE, URINALYSIS 05/20/2025 Negative  Negative mg/dL Final   • BILIRUBIN, URINALYSIS 05/20/2025 Negative  Negative Final   • KETONES, URINALYSIS 05/20/2025 20 (A)  Negative mg/dL Final   • SPECIFIC GRAVITY, URINALYSIS 05/20/2025 <1.005 (L)  1.005 - 1.030 Final   • OCCULT BLOOD, URINALYSIS 05/20/2025 Negative  Negative Final   • PH, URINALYSIS 05/20/2025 7.5 (H)  5.0 - 7.0 Final   • PROTEIN, URINALYSIS 05/20/2025 Negative  Negative mg/dL Final   • UROBILINOGEN, URINALYSIS 05/20/2025 0.2  0.2, 1.0 mg/dL Final   • NITRITE, URINALYSIS 05/20/2025 Negative  Negative Final   • LEUKOCYTE ESTERASE, URINALYSIS 05/20/2025 Negative  Negative Final   • URINE PREGNANCY,QUAL  05/20/2025 Negative   Corrected   • Internal Procedural Controls Accep* 05/20/2025 Yes   Final-Edited   • TEST LOT NUMBER 05/20/2025 0   Final-Edited   • TEST LOT EXPIRATION DATE 05/20/2025 0   Final-Edited   • WBC 05/20/2025 8.2  4.2 - 11.0 K/mcL Final   • RBC 05/20/2025 4.75  3.90 - 5.30 mil/mcL Final   • HGB 05/20/2025 14.0  12.0 - 15.5 g/dL Final   • HCT 05/20/2025 39.9  36.0 - 46.5 % Final   • MCV 05/20/2025 84.0  78.0 - 100.0 fl Final   • MCH 05/20/2025 29.5  26.0 - 34.0 pg Final   • MCHC 05/20/2025 35.1  32.0 - 36.5 g/dL Final   • RDW-CV 05/20/2025 11.9  11.0 - 15.0 % Final   • RDW-SD 05/20/2025 35.8  35.0 - 47.0 fL Final   • PLT 05/20/2025 355  140 - 450 K/mcL Final   • NRBC 05/20/2025 0  <=0 /100 WBC Final   • Neutrophil, Percent 05/20/2025 75  % Final   • Lymphocytes, Percent 05/20/2025 17  % Final   • Mono, Percent 05/20/2025 8  % Final   • Eosinophils, Percent 05/20/2025 0  % Final   • Basophils, Percent 05/20/2025 0  % Final   • Immature Granulocytes 05/20/2025 0  % Final   • Absolute Neutrophils 05/20/2025 6.1  1.8 - 8.0 K/mcL Final   • Absolute Lymphocytes 05/20/2025 1.4 (L)  1.5 - 6.5 K/mcL Final   • Absolute Monocytes 05/20/2025 0.7  0.0 - 0.8 K/mcL Final   • Absolute Eosinophils  05/20/2025 0.0  0.0 - 0.5 K/mcL Final   • Absolute Basophils 05/20/2025 0.0  0.0 - 0.2 K/mcL Final   • Absolute Immature Granulocytes 05/20/2025 0.0  0.0 - 0.2 K/mcL Final   • Magnesium 05/20/2025 1.7  1.7 - 2.4 mg/dL Final   • Amphetamines, Urine 05/20/2025 Positive (A)  Negative Final   • Barbiturates, Urine 05/20/2025 Negative  Negative Final   • Benzodiazepines, Urine 05/20/2025 Negative  Negative Final   • Cocaine/ Metabolite, Urine 05/20/2025 Negative  Negative Final   • Opiates, Urine 05/20/2025 Negative  Negative Final   • Phencyclidine, Urine 05/20/2025 Negative  Negative Final   • Cannabinoids, Urine 05/20/2025 Positive (A)  Negative Final   • Fentanyl, Urine Screen 05/20/2025 Negative  Negative Final   • Extra  Tube 05/20/2025 Hold for Add Ons   Final   • Extra Tube 05/20/2025 Hold for Add Ons   Final   External Lab on 05/11/2025   Component Date Value Ref Range Status   • Lab Result 05/11/2025 See scan for additional information   Final   Admission on 05/07/2025, Discharged on 05/07/2025   Component Date Value Ref Range Status   • Sodium 05/07/2025 138  135 - 145 mmol/L Final   • Potassium 05/07/2025 3.3 (L)  3.4 - 5.1 mmol/L Final   • Chloride 05/07/2025 102  97 - 110 mmol/L Final   • Carbon Dioxide 05/07/2025 19 (L)  21 - 32 mmol/L Final   • Anion Gap 05/07/2025 20 (H)  7 - 19 mmol/L Final   • Glucose 05/07/2025 77  70 - 99 mg/dL Final   • BUN 05/07/2025 7  5 - 18 mg/dL Final   • Creatinine 05/07/2025 0.40  0.39 - 0.90 mg/dL Final   • Glomerular Filtration Rate 05/07/2025    Final   • BUN/Cr 05/07/2025 18  7 - 25 Final   • Calcium 05/07/2025 9.6  8.0 - 11.0 mg/dL Final   • Systolic Blood Pressure 05/07/2025 124   Final   • Diastolic Blood Pressure 05/07/2025 68   Final   • Ventricular Rate EKG/Min (BPM) 05/07/2025 75   Final   • Atrial Rate (BPM) 05/07/2025 75   Final   • MO-Interval (MSEC) 05/07/2025 100   Final   • QRS-Interval (MSEC) 05/07/2025 84   Final   • QT-Interval (MSEC) 05/07/2025 414   Final   • QTc 05/07/2025 462   Final   • P Axis (Degrees) 05/07/2025 20   Final   • R Axis (Degrees) 05/07/2025 73   Final   • T Axis (Degrees) 05/07/2025 58   Final   • REPORT TEXT 05/07/2025    Final                    Value: Pediatric ECG analysis   Normal sinus rhythm  PEDIATRIC ANALYSIS - MANUAL COMPARISON REQUIRED  When compared with ECG of  07-APR-2025 11:16,  PREVIOUS ECG IS PRESENT  Confirmed by EDGAR CHEEMA MD ([id number]),  Crystal Harris ([id number]) on 5/7/2025 4:09:06 PM     • TSH 05/07/2025 0.442 (L)  0.463 - 4.130 mcUnits/mL Final   • Magnesium 05/07/2025 1.8  1.7 - 2.4 mg/dL Final   • WBC 05/07/2025 8.7  4.2 - 11.0 K/mcL Final   • RBC 05/07/2025 4.43  3.90 - 5.30 mil/mcL Final   • HGB 05/07/2025 13.0  12.0 - 15.5 g/dL  Final   • HCT 05/07/2025 37.5  36.0 - 46.5 % Final   • MCV 05/07/2025 84.7  78.0 - 100.0 fl Final   • MCH 05/07/2025 29.3  26.0 - 34.0 pg Final   • MCHC 05/07/2025 34.7  32.0 - 36.5 g/dL Final   • RDW-CV 05/07/2025 12.0  11.0 - 15.0 % Final   • RDW-SD 05/07/2025 36.8  35.0 - 47.0 fL Final   • PLT 05/07/2025 359  140 - 450 K/mcL Final   • NRBC 05/07/2025 0  <=0 /100 WBC Final   • Neutrophil, Percent 05/07/2025 72  % Final   • Lymphocytes, Percent 05/07/2025 19  % Final   • Mono, Percent 05/07/2025 9  % Final   • Eosinophils, Percent 05/07/2025 0  % Final   • Basophils, Percent 05/07/2025 0  % Final   • Immature Granulocytes 05/07/2025 0  % Final   • Absolute Neutrophils 05/07/2025 6.2  1.8 - 8.0 K/mcL Final   • Absolute Lymphocytes 05/07/2025 1.7  1.5 - 6.5 K/mcL Final   • Absolute Monocytes 05/07/2025 0.8  0.0 - 0.8 K/mcL Final   • Absolute Eosinophils  05/07/2025 0.0  0.0 - 0.5 K/mcL Final   • Absolute Basophils 05/07/2025 0.0  0.0 - 0.2 K/mcL Final   • Absolute Immature Granulocytes 05/07/2025 0.0  0.0 - 0.2 K/mcL Final   • PH, VENOUS - POINT OF CARE 05/07/2025 7.55 (H)  7.35 - 7.45 Units Final   • PCO2, VENOUS - POINT OF CARE 05/07/2025 20 (L)  38 - 51 mm Hg Final   • PO2, VENOUS - POINT OF CARE 05/07/2025 52 (H)  35 - 42 mm Hg Final   • HCO3, VENOUS - POINT OF CARE 05/07/2025 17 (L)  22 - 28 mmol/L Final   • BASE EXCESS / DEFICIT, VENOUS - PO* 05/07/2025 -3 (L)  -2 - 2 mmol/L Final   • O2 SATURATION, VENOUS - POINT OF C* 05/07/2025 91 (H)  60 - 80 % Final   • Extra Tube 05/07/2025 Hold for Add Ons   Final   • T4, Free 05/07/2025 1.4 (H)  0.8 - 1.3 ng/dL Final          ASSESSMENT AND PLAN:    Tachycardia (Primary)  - Recent episode of tachycardia with heart rate reaching 180 bpm.  - Current heart rate is 95 bpm, still slightly elevated.  - Presence of amphetamines in drug screen is concerning, denied knowingly taking anything other than THC.  - Advised to follow up with cardiologist to investigate cause of  tachycardia, especially considering neurocardiogenic syncope; advised to avoid substances like marijuana that can increase anxiety and heart rate.  - Recent thyroid test showed slightly low levels, indicating hyperthyroidism.  - Symptoms may include high heart rates and cold hands.  - Advised to follow up with endocrinologist to address findings and determine if treatment adjustments are necessary.  - Potential impact on blood pressure and pulse discussed.    Raynaud's phenomenon without gangrene  - Symptoms include episodes of cold and numb hands, sometimes turning blue or gray.  - Physical exam findings show delayed capillary refill in fingers.  - Advised to keep hands and feet warm and stay well-hydrated.  - Follow-up with pediatric cardiologist recommended to discuss symptoms and potential treatment options.    Blackhead  - Painful bump in ear identified as a blackhead.  - Advised to clean area well, use retinol or salicylic acid, and ensure AirPods are clean.  - No immediate intervention required; monitoring for any changes suggested.         Patient's questions answered. Patient states understanding and agrees with plan of care. Denies further questions.     Return if symptoms worsen or fail to improve follow up with PCP.    Patient Care Team:  Sidra Borges MD as PCP - General (Family Practice)    SHANELLE Hernandez       The procedure was performed independently

## 2025-07-17 PROBLEM — K21.9 GASTRO-ESOPHAGEAL REFLUX DISEASE WITHOUT ESOPHAGITIS: Chronic | Status: ACTIVE | Noted: 2025-03-05

## 2025-07-17 PROBLEM — I10 ESSENTIAL (PRIMARY) HYPERTENSION: Chronic | Status: ACTIVE | Noted: 2025-03-05

## 2025-07-17 PROBLEM — I25.10 ATHEROSCLEROTIC HEART DISEASE OF NATIVE CORONARY ARTERY WITHOUT ANGINA PECTORIS: Chronic | Status: ACTIVE | Noted: 2025-03-06

## 2025-07-17 PROBLEM — E78.5 HYPERLIPIDEMIA, UNSPECIFIED: Chronic | Status: ACTIVE | Noted: 2025-03-05

## 2025-07-17 PROBLEM — M19.90 UNSPECIFIED OSTEOARTHRITIS, UNSPECIFIED SITE: Chronic | Status: ACTIVE | Noted: 2025-03-05

## 2025-07-17 PROBLEM — E11.9 TYPE 2 DIABETES MELLITUS WITHOUT COMPLICATIONS: Chronic | Status: ACTIVE | Noted: 2025-03-05

## 2025-07-25 ENCOUNTER — INPATIENT (INPATIENT)
Facility: HOSPITAL | Age: 70
LOS: 0 days | Discharge: HOME | End: 2025-07-26
Attending: INTERNAL MEDICINE | Admitting: INTERNAL MEDICINE
Payer: COMMERCIAL

## 2025-07-25 VITALS
WEIGHT: 195.11 LBS | TEMPERATURE: 98 F | HEART RATE: 56 BPM | OXYGEN SATURATION: 98 % | DIASTOLIC BLOOD PRESSURE: 96 MMHG | SYSTOLIC BLOOD PRESSURE: 186 MMHG | HEIGHT: 67 IN | RESPIRATION RATE: 17 BRPM

## 2025-07-25 DIAGNOSIS — Z96.649 PRESENCE OF UNSPECIFIED ARTIFICIAL HIP JOINT: Chronic | ICD-10-CM

## 2025-07-25 DIAGNOSIS — I25.10 ATHEROSCLEROTIC HEART DISEASE OF NATIVE CORONARY ARTERY WITHOUT ANGINA PECTORIS: ICD-10-CM

## 2025-07-25 DIAGNOSIS — Z90.81 ACQUIRED ABSENCE OF SPLEEN: Chronic | ICD-10-CM

## 2025-07-25 DIAGNOSIS — Z98.890 OTHER SPECIFIED POSTPROCEDURAL STATES: Chronic | ICD-10-CM

## 2025-07-25 DIAGNOSIS — Z95.820 PERIPHERAL VASCULAR ANGIOPLASTY STATUS WITH IMPLANTS AND GRAFTS: Chronic | ICD-10-CM

## 2025-07-25 LAB
ANION GAP SERPL CALC-SCNC: 12 MMOL/L — SIGNIFICANT CHANGE UP (ref 7–14)
BUN SERPL-MCNC: 10 MG/DL — SIGNIFICANT CHANGE UP (ref 7–23)
CALCIUM SERPL-MCNC: 9.7 MG/DL — SIGNIFICANT CHANGE UP (ref 8.4–10.5)
CHLORIDE SERPL-SCNC: 103 MMOL/L — SIGNIFICANT CHANGE UP (ref 98–107)
CO2 SERPL-SCNC: 24 MMOL/L — SIGNIFICANT CHANGE UP (ref 22–31)
CREAT SERPL-MCNC: 0.69 MG/DL — SIGNIFICANT CHANGE UP (ref 0.5–1.3)
EGFR: 101 ML/MIN/1.73M2 — SIGNIFICANT CHANGE UP
EGFR: 101 ML/MIN/1.73M2 — SIGNIFICANT CHANGE UP
GLUCOSE SERPL-MCNC: 131 MG/DL — HIGH (ref 70–99)
HCT VFR BLD CALC: 39.7 % — SIGNIFICANT CHANGE UP (ref 39–50)
HGB BLD-MCNC: 13.4 G/DL — SIGNIFICANT CHANGE UP (ref 13–17)
MCHC RBC-ENTMCNC: 28.3 PG — SIGNIFICANT CHANGE UP (ref 27–34)
MCHC RBC-ENTMCNC: 33.8 G/DL — SIGNIFICANT CHANGE UP (ref 32–36)
MCV RBC AUTO: 83.8 FL — SIGNIFICANT CHANGE UP (ref 80–100)
NRBC # BLD AUTO: 0 K/UL — SIGNIFICANT CHANGE UP (ref 0–0)
NRBC # FLD: 0 K/UL — SIGNIFICANT CHANGE UP (ref 0–0)
NRBC BLD AUTO-RTO: 0 /100 WBCS — SIGNIFICANT CHANGE UP (ref 0–0)
PLATELET # BLD AUTO: 320 K/UL — SIGNIFICANT CHANGE UP (ref 150–400)
PMV BLD: 9.5 FL — SIGNIFICANT CHANGE UP (ref 7–13)
POTASSIUM SERPL-MCNC: 3.7 MMOL/L — SIGNIFICANT CHANGE UP (ref 3.5–5.3)
POTASSIUM SERPL-SCNC: 3.7 MMOL/L — SIGNIFICANT CHANGE UP (ref 3.5–5.3)
RBC # BLD: 4.74 M/UL — SIGNIFICANT CHANGE UP (ref 4.2–5.8)
RBC # FLD: 14.9 % — HIGH (ref 10.3–14.5)
SODIUM SERPL-SCNC: 139 MMOL/L — SIGNIFICANT CHANGE UP (ref 135–145)
WBC # BLD: 9.4 K/UL — SIGNIFICANT CHANGE UP (ref 3.8–10.5)
WBC # FLD AUTO: 9.4 K/UL — SIGNIFICANT CHANGE UP (ref 3.8–10.5)

## 2025-07-25 PROCEDURE — 99152 MOD SED SAME PHYS/QHP 5/>YRS: CPT

## 2025-07-25 PROCEDURE — 92920 PRQ TRLUML C ANGIOP 1ART&/BR: CPT | Mod: LC

## 2025-07-25 PROCEDURE — 93010 ELECTROCARDIOGRAM REPORT: CPT | Mod: 76

## 2025-07-25 RX ORDER — DEXTROSE 50 % IN WATER 50 %
15 SYRINGE (ML) INTRAVENOUS ONCE
Refills: 0 | Status: DISCONTINUED | OUTPATIENT
Start: 2025-07-25 | End: 2025-07-26

## 2025-07-25 RX ORDER — ACETAMINOPHEN 500 MG/5ML
650 LIQUID (ML) ORAL ONCE
Refills: 0 | Status: COMPLETED | OUTPATIENT
Start: 2025-07-25 | End: 2025-07-25

## 2025-07-25 RX ORDER — SODIUM CHLORIDE 9 G/1000ML
1000 INJECTION, SOLUTION INTRAVENOUS
Refills: 0 | Status: DISCONTINUED | OUTPATIENT
Start: 2025-07-25 | End: 2025-07-26

## 2025-07-25 RX ORDER — DEXTROSE 50 % IN WATER 50 %
25 SYRINGE (ML) INTRAVENOUS ONCE
Refills: 0 | Status: DISCONTINUED | OUTPATIENT
Start: 2025-07-25 | End: 2025-07-26

## 2025-07-25 RX ORDER — INSULIN LISPRO 100 U/ML
INJECTION, SOLUTION INTRAVENOUS; SUBCUTANEOUS
Refills: 0 | Status: DISCONTINUED | OUTPATIENT
Start: 2025-07-25 | End: 2025-07-26

## 2025-07-25 RX ORDER — ATORVASTATIN CALCIUM 80 MG/1
40 TABLET, FILM COATED ORAL AT BEDTIME
Refills: 0 | Status: DISCONTINUED | OUTPATIENT
Start: 2025-07-25 | End: 2025-07-26

## 2025-07-25 RX ORDER — GLUCAGON 3 MG/1
1 POWDER NASAL ONCE
Refills: 0 | Status: DISCONTINUED | OUTPATIENT
Start: 2025-07-25 | End: 2025-07-26

## 2025-07-25 RX ORDER — CARVEDILOL 3.12 MG/1
1 TABLET, FILM COATED ORAL
Refills: 0 | DISCHARGE

## 2025-07-25 RX ORDER — CLOPIDOGREL BISULFATE 75 MG/1
75 TABLET, FILM COATED ORAL DAILY
Refills: 0 | Status: DISCONTINUED | OUTPATIENT
Start: 2025-07-26 | End: 2025-07-26

## 2025-07-25 RX ORDER — DEXTROSE 50 % IN WATER 50 %
12.5 SYRINGE (ML) INTRAVENOUS ONCE
Refills: 0 | Status: DISCONTINUED | OUTPATIENT
Start: 2025-07-25 | End: 2025-07-26

## 2025-07-25 RX ORDER — CARVEDILOL 3.12 MG/1
3.12 TABLET, FILM COATED ORAL EVERY 12 HOURS
Refills: 0 | Status: DISCONTINUED | OUTPATIENT
Start: 2025-07-25 | End: 2025-07-26

## 2025-07-25 RX ORDER — ASPIRIN 325 MG
81 TABLET ORAL DAILY
Refills: 0 | Status: DISCONTINUED | OUTPATIENT
Start: 2025-07-26 | End: 2025-07-26

## 2025-07-25 RX ORDER — AMLODIPINE BESYLATE 10 MG/1
10 TABLET ORAL DAILY
Refills: 0 | Status: DISCONTINUED | OUTPATIENT
Start: 2025-07-26 | End: 2025-07-26

## 2025-07-25 RX ADMIN — Medication 160 MILLIGRAM(S): at 14:52

## 2025-07-25 RX ADMIN — CARVEDILOL 3.12 MILLIGRAM(S): 3.12 TABLET, FILM COATED ORAL at 17:51

## 2025-07-25 RX ADMIN — ATORVASTATIN CALCIUM 40 MILLIGRAM(S): 80 TABLET, FILM COATED ORAL at 21:21

## 2025-07-25 RX ADMIN — Medication 650 MILLIGRAM(S): at 23:10

## 2025-07-25 RX ADMIN — INSULIN LISPRO 1: 100 INJECTION, SOLUTION INTRAVENOUS; SUBCUTANEOUS at 16:31

## 2025-07-25 RX ADMIN — Medication 75 MILLILITER(S): at 14:53

## 2025-07-25 RX ADMIN — Medication 3 MILLILITER(S): at 21:38

## 2025-07-26 ENCOUNTER — TRANSCRIPTION ENCOUNTER (OUTPATIENT)
Age: 70
End: 2025-07-26

## 2025-07-26 VITALS
OXYGEN SATURATION: 100 % | DIASTOLIC BLOOD PRESSURE: 84 MMHG | SYSTOLIC BLOOD PRESSURE: 154 MMHG | HEART RATE: 54 BPM | RESPIRATION RATE: 18 BRPM | TEMPERATURE: 98 F

## 2025-07-26 LAB
ANION GAP SERPL CALC-SCNC: 13 MMOL/L — SIGNIFICANT CHANGE UP (ref 7–14)
BUN SERPL-MCNC: 11 MG/DL — SIGNIFICANT CHANGE UP (ref 7–23)
CALCIUM SERPL-MCNC: 9.5 MG/DL — SIGNIFICANT CHANGE UP (ref 8.4–10.5)
CHLORIDE SERPL-SCNC: 100 MMOL/L — SIGNIFICANT CHANGE UP (ref 98–107)
CO2 SERPL-SCNC: 22 MMOL/L — SIGNIFICANT CHANGE UP (ref 22–31)
CREAT SERPL-MCNC: 0.67 MG/DL — SIGNIFICANT CHANGE UP (ref 0.5–1.3)
EGFR: 102 ML/MIN/1.73M2 — SIGNIFICANT CHANGE UP
EGFR: 102 ML/MIN/1.73M2 — SIGNIFICANT CHANGE UP
GLUCOSE SERPL-MCNC: 135 MG/DL — HIGH (ref 70–99)
HCT VFR BLD CALC: 37.2 % — LOW (ref 39–50)
HGB BLD-MCNC: 12.9 G/DL — LOW (ref 13–17)
MAGNESIUM SERPL-MCNC: 1.8 MG/DL — SIGNIFICANT CHANGE UP (ref 1.6–2.6)
MCHC RBC-ENTMCNC: 28.5 PG — SIGNIFICANT CHANGE UP (ref 27–34)
MCHC RBC-ENTMCNC: 34.7 G/DL — SIGNIFICANT CHANGE UP (ref 32–36)
MCV RBC AUTO: 82.3 FL — SIGNIFICANT CHANGE UP (ref 80–100)
NRBC # BLD AUTO: 0 K/UL — SIGNIFICANT CHANGE UP (ref 0–0)
NRBC # FLD: 0 K/UL — SIGNIFICANT CHANGE UP (ref 0–0)
NRBC BLD AUTO-RTO: 0 /100 WBCS — SIGNIFICANT CHANGE UP (ref 0–0)
PHOSPHATE SERPL-MCNC: 3.8 MG/DL — SIGNIFICANT CHANGE UP (ref 2.5–4.5)
PLATELET # BLD AUTO: 316 K/UL — SIGNIFICANT CHANGE UP (ref 150–400)
PMV BLD: 9.6 FL — SIGNIFICANT CHANGE UP (ref 7–13)
POTASSIUM SERPL-MCNC: 3.2 MMOL/L — LOW (ref 3.5–5.3)
POTASSIUM SERPL-SCNC: 3.2 MMOL/L — LOW (ref 3.5–5.3)
RBC # BLD: 4.52 M/UL — SIGNIFICANT CHANGE UP (ref 4.2–5.8)
RBC # FLD: 14.6 % — HIGH (ref 10.3–14.5)
SODIUM SERPL-SCNC: 135 MMOL/L — SIGNIFICANT CHANGE UP (ref 135–145)
WBC # BLD: 9.02 K/UL — SIGNIFICANT CHANGE UP (ref 3.8–10.5)
WBC # FLD AUTO: 9.02 K/UL — SIGNIFICANT CHANGE UP (ref 3.8–10.5)

## 2025-07-26 RX ORDER — MAGNESIUM OXIDE 400 MG
400 TABLET ORAL
Refills: 0 | Status: DISCONTINUED | OUTPATIENT
Start: 2025-07-26 | End: 2025-07-26

## 2025-07-26 RX ADMIN — CLOPIDOGREL BISULFATE 75 MILLIGRAM(S): 75 TABLET, FILM COATED ORAL at 12:53

## 2025-07-26 RX ADMIN — Medication 650 MILLIGRAM(S): at 00:10

## 2025-07-26 RX ADMIN — Medication 3 MILLILITER(S): at 14:00

## 2025-07-26 RX ADMIN — Medication 40 MILLIGRAM(S): at 05:02

## 2025-07-26 RX ADMIN — AMLODIPINE BESYLATE 10 MILLIGRAM(S): 10 TABLET ORAL at 05:02

## 2025-07-26 RX ADMIN — Medication 81 MILLIGRAM(S): at 12:53

## 2025-07-26 RX ADMIN — Medication 160 MILLIGRAM(S): at 05:02

## 2025-07-26 RX ADMIN — CARVEDILOL 3.12 MILLIGRAM(S): 3.12 TABLET, FILM COATED ORAL at 05:02

## 2025-07-26 RX ADMIN — Medication 40 MILLIEQUIVALENT(S): at 12:53

## 2025-07-26 RX ADMIN — Medication 3 MILLILITER(S): at 05:02

## 2025-07-26 RX ADMIN — Medication 400 MILLIGRAM(S): at 12:53
